# Patient Record
Sex: MALE | Race: BLACK OR AFRICAN AMERICAN | NOT HISPANIC OR LATINO | Employment: OTHER | URBAN - METROPOLITAN AREA
[De-identification: names, ages, dates, MRNs, and addresses within clinical notes are randomized per-mention and may not be internally consistent; named-entity substitution may affect disease eponyms.]

---

## 2019-04-29 ENCOUNTER — APPOINTMENT (EMERGENCY)
Dept: RADIOLOGY | Facility: HOSPITAL | Age: 84
DRG: 884 | End: 2019-04-29
Payer: MEDICARE

## 2019-04-29 ENCOUNTER — HOSPITAL ENCOUNTER (INPATIENT)
Facility: HOSPITAL | Age: 84
LOS: 38 days | Discharge: NURSING, REHAB OR LONG-TERM CARE | DRG: 884 | End: 2019-06-06
Attending: EMERGENCY MEDICINE | Admitting: FAMILY MEDICINE
Payer: MEDICARE

## 2019-04-29 ENCOUNTER — APPOINTMENT (EMERGENCY)
Dept: CT IMAGING | Facility: HOSPITAL | Age: 84
DRG: 884 | End: 2019-04-29
Payer: MEDICARE

## 2019-04-29 DIAGNOSIS — R41.82 ALTERED MENTAL STATUS: Primary | ICD-10-CM

## 2019-04-29 DIAGNOSIS — F03.90 ADVANCED DEMENTIA (HCC): ICD-10-CM

## 2019-04-29 DIAGNOSIS — L60.9 NAIL ABNORMALITIES: ICD-10-CM

## 2019-04-29 DIAGNOSIS — F03.90 DEMENTIA (HCC): ICD-10-CM

## 2019-04-29 DIAGNOSIS — L97.513 RIGHT FOOT ULCER, WITH NECROSIS OF MUSCLE (HCC): ICD-10-CM

## 2019-04-29 DIAGNOSIS — E53.8 B12 DEFICIENCY: ICD-10-CM

## 2019-04-29 DIAGNOSIS — E87.6 HYPOKALEMIA: ICD-10-CM

## 2019-04-29 DIAGNOSIS — I10 ESSENTIAL HYPERTENSION: ICD-10-CM

## 2019-04-29 DIAGNOSIS — D50.8 IRON DEFICIENCY ANEMIA SECONDARY TO INADEQUATE DIETARY IRON INTAKE: ICD-10-CM

## 2019-04-29 PROBLEM — Z87.19 HISTORY OF GASTROESOPHAGEAL REFLUX (GERD): Status: ACTIVE | Noted: 2019-04-29

## 2019-04-29 PROBLEM — R41.0 DISORIENTATION: Status: ACTIVE | Noted: 2019-04-29

## 2019-04-29 PROBLEM — I51.7 LEFT VENTRICULAR HYPERTROPHY BY ELECTROCARDIOGRAM: Status: ACTIVE | Noted: 2019-04-29

## 2019-04-29 LAB
ACANTHOCYTES BLD QL SMEAR: PRESENT
ALBUMIN SERPL BCP-MCNC: 3 G/DL (ref 3–5.2)
ALP SERPL-CCNC: 93 U/L (ref 43–122)
ALT SERPL W P-5'-P-CCNC: 16 U/L (ref 9–52)
AMMONIA PLAS-SCNC: 10 UMOL/L (ref 9–33)
AMMONIA PLAS-SCNC: 22 UMOL/L (ref 9–33)
AMPHETAMINES SERPL QL SCN: NEGATIVE
ANION GAP SERPL CALCULATED.3IONS-SCNC: 4 MMOL/L (ref 5–14)
ANISOCYTOSIS BLD QL SMEAR: PRESENT
APAP SERPL-MCNC: <10 UG/ML (ref 10–20)
APTT PPP: 27 SECONDS (ref 23–34)
AST SERPL W P-5'-P-CCNC: 13 U/L (ref 17–59)
BACTERIA UR QL AUTO: ABNORMAL /HPF
BARBITURATES UR QL: NEGATIVE
BASOPHILS # BLD AUTO: 0.1 THOUSANDS/ΜL (ref 0–0.1)
BASOPHILS NFR BLD AUTO: 1 % (ref 0–1)
BENZODIAZ UR QL: NEGATIVE
BILIRUB SERPL-MCNC: 0.4 MG/DL
BILIRUB UR QL STRIP: NEGATIVE
BUN SERPL-MCNC: 16 MG/DL (ref 5–25)
CALCIUM SERPL-MCNC: 9.4 MG/DL (ref 8.4–10.2)
CAOX CRY URNS QL MICRO: ABNORMAL /HPF
CHLORIDE SERPL-SCNC: 106 MMOL/L (ref 97–108)
CLARITY UR: CLEAR
CO2 SERPL-SCNC: 28 MMOL/L (ref 22–30)
COCAINE UR QL: NEGATIVE
COLOR UR: ABNORMAL
CREAT SERPL-MCNC: 0.84 MG/DL (ref 0.7–1.5)
EOSINOPHIL # BLD AUTO: 0.2 THOUSAND/ΜL (ref 0–0.4)
EOSINOPHIL NFR BLD AUTO: 2 % (ref 0–6)
ERYTHROCYTE [DISTWIDTH] IN BLOOD BY AUTOMATED COUNT: 20.5 %
GFR SERPL CREATININE-BSD FRML MDRD: 94 ML/MIN/1.73SQ M
GLUCOSE SERPL-MCNC: 136 MG/DL (ref 70–99)
GLUCOSE UR STRIP-MCNC: NEGATIVE MG/DL
HCT VFR BLD AUTO: 37.9 % (ref 41–53)
HGB BLD-MCNC: 11.7 G/DL (ref 13.5–17.5)
HGB UR QL STRIP.AUTO: NEGATIVE
HYPERCHROMIA BLD QL SMEAR: PRESENT
INR PPP: 0.99 (ref 0.89–1.1)
KETONES UR STRIP-MCNC: NEGATIVE MG/DL
LEUKOCYTE ESTERASE UR QL STRIP: NEGATIVE
LYMPHOCYTES # BLD AUTO: 1.4 THOUSANDS/ΜL (ref 0.5–4)
LYMPHOCYTES NFR BLD AUTO: 19 % (ref 25–45)
MAGNESIUM SERPL-MCNC: 2.2 MG/DL (ref 1.6–2.3)
MCH RBC QN AUTO: 23.8 PG (ref 26–34)
MCHC RBC AUTO-ENTMCNC: 31 G/DL (ref 31–36)
MCV RBC AUTO: 77 FL (ref 80–100)
METHADONE UR QL: NEGATIVE
MONOCYTES # BLD AUTO: 0.5 THOUSAND/ΜL (ref 0.2–0.9)
MONOCYTES NFR BLD AUTO: 6 % (ref 1–10)
MUCOUS THREADS UR QL AUTO: ABNORMAL
NEUTROPHILS # BLD AUTO: 5.3 THOUSANDS/ΜL (ref 1.8–7.8)
NEUTS SEG NFR BLD AUTO: 71 % (ref 45–65)
NITRITE UR QL STRIP: NEGATIVE
NON-SQ EPI CELLS URNS QL MICRO: ABNORMAL /HPF
OPIATES UR QL SCN: NEGATIVE
OVALOCYTES BLD QL SMEAR: PRESENT
PCP UR QL: NEGATIVE
PH UR STRIP.AUTO: 6 [PH]
PLATELET # BLD AUTO: 297 THOUSANDS/UL (ref 150–450)
PLATELET # BLD AUTO: 334 THOUSANDS/UL (ref 150–450)
PLATELET BLD QL SMEAR: ADEQUATE
PMV BLD AUTO: 9.2 FL (ref 8.9–12.7)
POIKILOCYTOSIS BLD QL SMEAR: PRESENT
POTASSIUM SERPL-SCNC: 4 MMOL/L (ref 3.6–5)
PROT SERPL-MCNC: 5.8 G/DL (ref 5.9–8.4)
PROT UR STRIP-MCNC: ABNORMAL MG/DL
PROTHROMBIN TIME: 10.5 SECONDS (ref 9.5–11.6)
RBC # BLD AUTO: 4.94 MILLION/UL (ref 4.5–5.9)
RBC #/AREA URNS AUTO: ABNORMAL /HPF
RBC MORPH BLD: NORMAL
SALICYLATES SERPL-MCNC: <1 MG/DL (ref 10–30)
SCHISTOCYTES BLD QL SMEAR: PRESENT
SODIUM SERPL-SCNC: 138 MMOL/L (ref 137–147)
SP GR UR STRIP.AUTO: 1.02 (ref 1–1.04)
THC UR QL: NEGATIVE
TROPONIN I SERPL-MCNC: <0.01 NG/ML (ref 0–0.03)
TSH SERPL DL<=0.05 MIU/L-ACNC: 1.51 UIU/ML (ref 0.47–4.68)
UROBILINOGEN UA: 4 MG/DL
WBC # BLD AUTO: 7.5 THOUSAND/UL (ref 4.5–11)
WBC #/AREA URNS AUTO: ABNORMAL /HPF

## 2019-04-29 PROCEDURE — 81001 URINALYSIS AUTO W/SCOPE: CPT | Performed by: EMERGENCY MEDICINE

## 2019-04-29 PROCEDURE — 82140 ASSAY OF AMMONIA: CPT | Performed by: EMERGENCY MEDICINE

## 2019-04-29 PROCEDURE — 99222 1ST HOSP IP/OBS MODERATE 55: CPT | Performed by: INTERNAL MEDICINE

## 2019-04-29 PROCEDURE — 82607 VITAMIN B-12: CPT | Performed by: INTERNAL MEDICINE

## 2019-04-29 PROCEDURE — 83735 ASSAY OF MAGNESIUM: CPT | Performed by: EMERGENCY MEDICINE

## 2019-04-29 PROCEDURE — 80329 ANALGESICS NON-OPIOID 1 OR 2: CPT | Performed by: EMERGENCY MEDICINE

## 2019-04-29 PROCEDURE — 99285 EMERGENCY DEPT VISIT HI MDM: CPT

## 2019-04-29 PROCEDURE — 80053 COMPREHEN METABOLIC PANEL: CPT | Performed by: EMERGENCY MEDICINE

## 2019-04-29 PROCEDURE — 70450 CT HEAD/BRAIN W/O DYE: CPT

## 2019-04-29 PROCEDURE — 71046 X-RAY EXAM CHEST 2 VIEWS: CPT

## 2019-04-29 PROCEDURE — 84484 ASSAY OF TROPONIN QUANT: CPT | Performed by: EMERGENCY MEDICINE

## 2019-04-29 PROCEDURE — 84443 ASSAY THYROID STIM HORMONE: CPT | Performed by: EMERGENCY MEDICINE

## 2019-04-29 PROCEDURE — 85025 COMPLETE CBC W/AUTO DIFF WBC: CPT | Performed by: EMERGENCY MEDICINE

## 2019-04-29 PROCEDURE — 85049 AUTOMATED PLATELET COUNT: CPT | Performed by: INTERNAL MEDICINE

## 2019-04-29 PROCEDURE — 93005 ELECTROCARDIOGRAM TRACING: CPT

## 2019-04-29 PROCEDURE — 1124F ACP DISCUSS-NO DSCNMKR DOCD: CPT | Performed by: FAMILY MEDICINE

## 2019-04-29 PROCEDURE — 85610 PROTHROMBIN TIME: CPT | Performed by: EMERGENCY MEDICINE

## 2019-04-29 PROCEDURE — 36415 COLL VENOUS BLD VENIPUNCTURE: CPT | Performed by: EMERGENCY MEDICINE

## 2019-04-29 PROCEDURE — 82746 ASSAY OF FOLIC ACID SERUM: CPT | Performed by: INTERNAL MEDICINE

## 2019-04-29 PROCEDURE — 99284 EMERGENCY DEPT VISIT MOD MDM: CPT | Performed by: EMERGENCY MEDICINE

## 2019-04-29 PROCEDURE — 82140 ASSAY OF AMMONIA: CPT | Performed by: INTERNAL MEDICINE

## 2019-04-29 PROCEDURE — 85730 THROMBOPLASTIN TIME PARTIAL: CPT | Performed by: EMERGENCY MEDICINE

## 2019-04-29 PROCEDURE — 80307 DRUG TEST PRSMV CHEM ANLYZR: CPT | Performed by: EMERGENCY MEDICINE

## 2019-04-29 PROCEDURE — 86592 SYPHILIS TEST NON-TREP QUAL: CPT | Performed by: INTERNAL MEDICINE

## 2019-04-29 PROCEDURE — 82075 ASSAY OF BREATH ETHANOL: CPT | Performed by: EMERGENCY MEDICINE

## 2019-04-29 RX ORDER — ACETAMINOPHEN 325 MG/1
650 TABLET ORAL EVERY 6 HOURS PRN
Status: DISCONTINUED | OUTPATIENT
Start: 2019-04-29 | End: 2019-06-06 | Stop reason: HOSPADM

## 2019-04-29 RX ORDER — HYDRALAZINE HYDROCHLORIDE 25 MG/1
25 TABLET, FILM COATED ORAL 3 TIMES DAILY PRN
Status: DISCONTINUED | OUTPATIENT
Start: 2019-04-29 | End: 2019-06-04

## 2019-04-29 RX ORDER — ONDANSETRON 2 MG/ML
4 INJECTION INTRAMUSCULAR; INTRAVENOUS EVERY 6 HOURS PRN
Status: DISCONTINUED | OUTPATIENT
Start: 2019-04-29 | End: 2019-04-30

## 2019-04-29 RX ORDER — HYDRALAZINE HYDROCHLORIDE 20 MG/ML
5 INJECTION INTRAMUSCULAR; INTRAVENOUS EVERY 6 HOURS PRN
Status: DISCONTINUED | OUTPATIENT
Start: 2019-04-29 | End: 2019-04-29

## 2019-04-29 RX ORDER — MAGNESIUM HYDROXIDE/ALUMINUM HYDROXICE/SIMETHICONE 120; 1200; 1200 MG/30ML; MG/30ML; MG/30ML
30 SUSPENSION ORAL EVERY 6 HOURS PRN
Status: DISCONTINUED | OUTPATIENT
Start: 2019-04-29 | End: 2019-06-06 | Stop reason: HOSPADM

## 2019-04-29 RX ADMIN — HYDRALAZINE HYDROCHLORIDE 25 MG: 25 TABLET ORAL at 22:03

## 2019-04-30 LAB
ANION GAP SERPL CALCULATED.3IONS-SCNC: 4 MMOL/L (ref 5–14)
ATRIAL RATE: 70 BPM
BASOPHILS # BLD AUTO: 0.1 THOUSANDS/ΜL (ref 0–0.1)
BASOPHILS NFR BLD AUTO: 1 % (ref 0–1)
BUN SERPL-MCNC: 15 MG/DL (ref 5–25)
CALCIUM SERPL-MCNC: 9.2 MG/DL (ref 8.4–10.2)
CHLORIDE SERPL-SCNC: 105 MMOL/L (ref 97–108)
CO2 SERPL-SCNC: 30 MMOL/L (ref 22–30)
CREAT SERPL-MCNC: 0.77 MG/DL (ref 0.7–1.5)
EOSINOPHIL # BLD AUTO: 0.2 THOUSAND/ΜL (ref 0–0.4)
EOSINOPHIL NFR BLD AUTO: 3 % (ref 0–6)
ERYTHROCYTE [DISTWIDTH] IN BLOOD BY AUTOMATED COUNT: 20.9 %
EST. AVERAGE GLUCOSE BLD GHB EST-MCNC: 123 MG/DL
FOLATE SERPL-MCNC: 14 NG/ML (ref 3.1–17.5)
GFR SERPL CREATININE-BSD FRML MDRD: 98 ML/MIN/1.73SQ M
GLUCOSE SERPL-MCNC: 102 MG/DL (ref 70–99)
HBA1C MFR BLD: 5.9 % (ref 4.2–6.3)
HCT VFR BLD AUTO: 36.4 % (ref 41–53)
HGB BLD-MCNC: 11.3 G/DL (ref 13.5–17.5)
LYMPHOCYTES # BLD AUTO: 1.7 THOUSANDS/ΜL (ref 0.5–4)
LYMPHOCYTES NFR BLD AUTO: 24 % (ref 25–45)
MCH RBC QN AUTO: 24.1 PG (ref 26–34)
MCHC RBC AUTO-ENTMCNC: 31.1 G/DL (ref 31–36)
MCV RBC AUTO: 77 FL (ref 80–100)
MONOCYTES # BLD AUTO: 0.6 THOUSAND/ΜL (ref 0.2–0.9)
MONOCYTES NFR BLD AUTO: 9 % (ref 1–10)
NEUTROPHILS # BLD AUTO: 4.5 THOUSANDS/ΜL (ref 1.8–7.8)
NEUTS SEG NFR BLD AUTO: 63 % (ref 45–65)
P AXIS: 35 DEGREES
PLATELET # BLD AUTO: 277 THOUSANDS/UL (ref 150–450)
PMV BLD AUTO: 8.7 FL (ref 8.9–12.7)
POTASSIUM SERPL-SCNC: 3.5 MMOL/L (ref 3.6–5)
PR INTERVAL: 170 MS
QRS AXIS: -57 DEGREES
QRSD INTERVAL: 108 MS
QT INTERVAL: 392 MS
QTC INTERVAL: 423 MS
RBC # BLD AUTO: 4.7 MILLION/UL (ref 4.5–5.9)
RPR SER QL: NORMAL
SODIUM SERPL-SCNC: 139 MMOL/L (ref 137–147)
T WAVE AXIS: -11 DEGREES
VENTRICULAR RATE: 70 BPM
VIT B12 SERPL-MCNC: 142 PG/ML (ref 100–900)
WBC # BLD AUTO: 7.1 THOUSAND/UL (ref 4.5–11)

## 2019-04-30 PROCEDURE — 85025 COMPLETE CBC W/AUTO DIFF WBC: CPT | Performed by: INTERNAL MEDICINE

## 2019-04-30 PROCEDURE — 0HBRXZZ EXCISION OF TOE NAIL, EXTERNAL APPROACH: ICD-10-PCS | Performed by: PODIATRIST

## 2019-04-30 PROCEDURE — 93010 ELECTROCARDIOGRAM REPORT: CPT | Performed by: INTERNAL MEDICINE

## 2019-04-30 PROCEDURE — 83036 HEMOGLOBIN GLYCOSYLATED A1C: CPT | Performed by: PHYSICIAN ASSISTANT

## 2019-04-30 PROCEDURE — 99232 SBSQ HOSP IP/OBS MODERATE 35: CPT | Performed by: FAMILY MEDICINE

## 2019-04-30 PROCEDURE — 80048 BASIC METABOLIC PNL TOTAL CA: CPT | Performed by: INTERNAL MEDICINE

## 2019-04-30 RX ORDER — AMLODIPINE BESYLATE 5 MG/1
5 TABLET ORAL DAILY
Status: DISCONTINUED | OUTPATIENT
Start: 2019-04-30 | End: 2019-05-02

## 2019-04-30 RX ADMIN — AMLODIPINE BESYLATE 5 MG: 5 TABLET ORAL at 11:30

## 2019-04-30 RX ADMIN — ENOXAPARIN SODIUM 40 MG: 40 INJECTION SUBCUTANEOUS at 08:30

## 2019-05-01 LAB
ANION GAP SERPL CALCULATED.3IONS-SCNC: 4 MMOL/L (ref 5–14)
BASOPHILS # BLD AUTO: 0 THOUSANDS/ΜL (ref 0–0.1)
BASOPHILS NFR BLD AUTO: 1 % (ref 0–1)
BUN SERPL-MCNC: 14 MG/DL (ref 5–25)
CALCIUM SERPL-MCNC: 9.2 MG/DL (ref 8.4–10.2)
CHLORIDE SERPL-SCNC: 103 MMOL/L (ref 97–108)
CO2 SERPL-SCNC: 31 MMOL/L (ref 22–30)
CREAT SERPL-MCNC: 0.76 MG/DL (ref 0.7–1.5)
EOSINOPHIL # BLD AUTO: 0.2 THOUSAND/ΜL (ref 0–0.4)
EOSINOPHIL NFR BLD AUTO: 2 % (ref 0–6)
ERYTHROCYTE [DISTWIDTH] IN BLOOD BY AUTOMATED COUNT: 21 %
FERRITIN SERPL-MCNC: 9 NG/ML (ref 8–388)
FERRITIN SERPL-MCNC: 9 NG/ML (ref 8–388)
GFR SERPL CREATININE-BSD FRML MDRD: 98 ML/MIN/1.73SQ M
GLUCOSE SERPL-MCNC: 88 MG/DL (ref 70–99)
HCT VFR BLD AUTO: 37.3 % (ref 41–53)
HGB BLD-MCNC: 11.4 G/DL (ref 13.5–17.5)
IRON SATN MFR SERPL: 17 %
IRON SERPL-MCNC: 53 UG/DL (ref 65–175)
LYMPHOCYTES # BLD AUTO: 1.5 THOUSANDS/ΜL (ref 0.5–4)
LYMPHOCYTES NFR BLD AUTO: 21 % (ref 25–45)
MCH RBC QN AUTO: 23.8 PG (ref 26–34)
MCHC RBC AUTO-ENTMCNC: 30.5 G/DL (ref 31–36)
MCV RBC AUTO: 78 FL (ref 80–100)
MONOCYTES # BLD AUTO: 0.5 THOUSAND/ΜL (ref 0.2–0.9)
MONOCYTES NFR BLD AUTO: 7 % (ref 1–10)
NEUTROPHILS # BLD AUTO: 4.9 THOUSANDS/ΜL (ref 1.8–7.8)
NEUTS SEG NFR BLD AUTO: 69 % (ref 45–65)
PLATELET # BLD AUTO: 298 THOUSANDS/UL (ref 150–450)
PMV BLD AUTO: 9 FL (ref 8.9–12.7)
POTASSIUM SERPL-SCNC: 3.5 MMOL/L (ref 3.6–5)
RBC # BLD AUTO: 4.78 MILLION/UL (ref 4.5–5.9)
SODIUM SERPL-SCNC: 138 MMOL/L (ref 137–147)
TIBC SERPL-MCNC: 308 UG/DL (ref 250–450)
WBC # BLD AUTO: 7.1 THOUSAND/UL (ref 4.5–11)

## 2019-05-01 PROCEDURE — 99231 SBSQ HOSP IP/OBS SF/LOW 25: CPT | Performed by: INTERNAL MEDICINE

## 2019-05-01 PROCEDURE — 83550 IRON BINDING TEST: CPT | Performed by: FAMILY MEDICINE

## 2019-05-01 PROCEDURE — G8978 MOBILITY CURRENT STATUS: HCPCS

## 2019-05-01 PROCEDURE — 97167 OT EVAL HIGH COMPLEX 60 MIN: CPT

## 2019-05-01 PROCEDURE — 82728 ASSAY OF FERRITIN: CPT | Performed by: FAMILY MEDICINE

## 2019-05-01 PROCEDURE — G8979 MOBILITY GOAL STATUS: HCPCS

## 2019-05-01 PROCEDURE — 80048 BASIC METABOLIC PNL TOTAL CA: CPT | Performed by: PHYSICIAN ASSISTANT

## 2019-05-01 PROCEDURE — G8980 MOBILITY D/C STATUS: HCPCS

## 2019-05-01 PROCEDURE — 97162 PT EVAL MOD COMPLEX 30 MIN: CPT

## 2019-05-01 PROCEDURE — 83540 ASSAY OF IRON: CPT | Performed by: FAMILY MEDICINE

## 2019-05-01 PROCEDURE — 85025 COMPLETE CBC W/AUTO DIFF WBC: CPT | Performed by: PHYSICIAN ASSISTANT

## 2019-05-01 RX ORDER — DONEPEZIL HYDROCHLORIDE 5 MG/1
5 TABLET, FILM COATED ORAL
Status: DISCONTINUED | OUTPATIENT
Start: 2019-05-01 | End: 2019-06-06 | Stop reason: HOSPADM

## 2019-05-01 RX ADMIN — DONEPEZIL HYDROCHLORIDE 5 MG: 5 TABLET, FILM COATED ORAL at 21:27

## 2019-05-01 RX ADMIN — ENOXAPARIN SODIUM 40 MG: 40 INJECTION SUBCUTANEOUS at 08:22

## 2019-05-01 RX ADMIN — AMLODIPINE BESYLATE 5 MG: 5 TABLET ORAL at 08:22

## 2019-05-02 PROCEDURE — 99231 SBSQ HOSP IP/OBS SF/LOW 25: CPT | Performed by: INTERNAL MEDICINE

## 2019-05-02 RX ORDER — AMLODIPINE BESYLATE 10 MG/1
10 TABLET ORAL DAILY
Status: DISCONTINUED | OUTPATIENT
Start: 2019-05-03 | End: 2019-05-24

## 2019-05-02 RX ORDER — POTASSIUM CHLORIDE 20 MEQ/1
40 TABLET, EXTENDED RELEASE ORAL ONCE
Status: COMPLETED | OUTPATIENT
Start: 2019-05-02 | End: 2019-05-02

## 2019-05-02 RX ADMIN — DONEPEZIL HYDROCHLORIDE 5 MG: 5 TABLET, FILM COATED ORAL at 23:00

## 2019-05-02 RX ADMIN — ENOXAPARIN SODIUM 40 MG: 40 INJECTION SUBCUTANEOUS at 08:01

## 2019-05-02 RX ADMIN — POTASSIUM CHLORIDE 40 MEQ: 20 TABLET, EXTENDED RELEASE ORAL at 15:30

## 2019-05-02 RX ADMIN — AMLODIPINE BESYLATE 5 MG: 5 TABLET ORAL at 08:01

## 2019-05-02 RX ADMIN — HYDRALAZINE HYDROCHLORIDE 25 MG: 25 TABLET ORAL at 21:44

## 2019-05-03 PROCEDURE — 99231 SBSQ HOSP IP/OBS SF/LOW 25: CPT | Performed by: INTERNAL MEDICINE

## 2019-05-03 RX ADMIN — AMLODIPINE BESYLATE 10 MG: 10 TABLET ORAL at 08:06

## 2019-05-03 RX ADMIN — ENOXAPARIN SODIUM 40 MG: 40 INJECTION SUBCUTANEOUS at 08:06

## 2019-05-03 RX ADMIN — DONEPEZIL HYDROCHLORIDE 5 MG: 5 TABLET, FILM COATED ORAL at 22:29

## 2019-05-04 PROBLEM — D50.9 IRON DEFICIENCY ANEMIA: Status: ACTIVE | Noted: 2019-05-04

## 2019-05-04 PROCEDURE — 99232 SBSQ HOSP IP/OBS MODERATE 35: CPT | Performed by: FAMILY MEDICINE

## 2019-05-04 RX ORDER — FERROUS SULFATE 325(65) MG
325 TABLET ORAL 2 TIMES DAILY WITH MEALS
Status: DISCONTINUED | OUTPATIENT
Start: 2019-05-04 | End: 2019-06-06 | Stop reason: HOSPADM

## 2019-05-04 RX ORDER — POTASSIUM CHLORIDE 20 MEQ/1
40 TABLET, EXTENDED RELEASE ORAL 2 TIMES DAILY
Status: DISCONTINUED | OUTPATIENT
Start: 2019-05-04 | End: 2019-05-04

## 2019-05-04 RX ADMIN — FERROUS SULFATE TAB 325 MG (65 MG ELEMENTAL FE) 325 MG: 325 (65 FE) TAB at 16:17

## 2019-05-04 RX ADMIN — AMLODIPINE BESYLATE 10 MG: 10 TABLET ORAL at 10:12

## 2019-05-04 RX ADMIN — DONEPEZIL HYDROCHLORIDE 5 MG: 5 TABLET, FILM COATED ORAL at 21:26

## 2019-05-04 RX ADMIN — POTASSIUM CHLORIDE 40 MEQ: 20 TABLET, EXTENDED RELEASE ORAL at 10:13

## 2019-05-04 RX ADMIN — ENOXAPARIN SODIUM 40 MG: 40 INJECTION SUBCUTANEOUS at 10:12

## 2019-05-05 PROCEDURE — 99231 SBSQ HOSP IP/OBS SF/LOW 25: CPT | Performed by: FAMILY MEDICINE

## 2019-05-05 RX ADMIN — FERROUS SULFATE TAB 325 MG (65 MG ELEMENTAL FE) 325 MG: 325 (65 FE) TAB at 08:03

## 2019-05-05 RX ADMIN — DONEPEZIL HYDROCHLORIDE 5 MG: 5 TABLET, FILM COATED ORAL at 21:36

## 2019-05-05 RX ADMIN — AMLODIPINE BESYLATE 10 MG: 10 TABLET ORAL at 08:02

## 2019-05-05 RX ADMIN — FERROUS SULFATE TAB 325 MG (65 MG ELEMENTAL FE) 325 MG: 325 (65 FE) TAB at 15:56

## 2019-05-06 PROCEDURE — 99231 SBSQ HOSP IP/OBS SF/LOW 25: CPT | Performed by: PHYSICIAN ASSISTANT

## 2019-05-06 RX ORDER — POTASSIUM CHLORIDE 20 MEQ/1
40 TABLET, EXTENDED RELEASE ORAL ONCE
Status: COMPLETED | OUTPATIENT
Start: 2019-05-06 | End: 2019-05-06

## 2019-05-06 RX ADMIN — FERROUS SULFATE TAB 325 MG (65 MG ELEMENTAL FE) 325 MG: 325 (65 FE) TAB at 09:43

## 2019-05-06 RX ADMIN — AMLODIPINE BESYLATE 10 MG: 10 TABLET ORAL at 09:43

## 2019-05-06 RX ADMIN — POTASSIUM CHLORIDE 40 MEQ: 20 TABLET, EXTENDED RELEASE ORAL at 09:42

## 2019-05-06 RX ADMIN — FERROUS SULFATE TAB 325 MG (65 MG ELEMENTAL FE) 325 MG: 325 (65 FE) TAB at 18:07

## 2019-05-06 RX ADMIN — DONEPEZIL HYDROCHLORIDE 5 MG: 5 TABLET, FILM COATED ORAL at 21:28

## 2019-05-07 PROBLEM — E53.8 B12 DEFICIENCY: Status: ACTIVE | Noted: 2019-05-07

## 2019-05-07 PROCEDURE — 99231 SBSQ HOSP IP/OBS SF/LOW 25: CPT | Performed by: PHYSICIAN ASSISTANT

## 2019-05-07 RX ORDER — CYANOCOBALAMIN 1000 UG/ML
1000 INJECTION INTRAMUSCULAR; SUBCUTANEOUS DAILY
Status: COMPLETED | OUTPATIENT
Start: 2019-05-07 | End: 2019-05-13

## 2019-05-07 RX ADMIN — DONEPEZIL HYDROCHLORIDE 5 MG: 5 TABLET, FILM COATED ORAL at 22:34

## 2019-05-07 RX ADMIN — FERROUS SULFATE TAB 325 MG (65 MG ELEMENTAL FE) 325 MG: 325 (65 FE) TAB at 09:07

## 2019-05-07 RX ADMIN — FERROUS SULFATE TAB 325 MG (65 MG ELEMENTAL FE) 325 MG: 325 (65 FE) TAB at 16:43

## 2019-05-07 RX ADMIN — CYANOCOBALAMIN 1000 MCG: 1000 INJECTION INTRAMUSCULAR; SUBCUTANEOUS at 16:11

## 2019-05-07 RX ADMIN — AMLODIPINE BESYLATE 10 MG: 10 TABLET ORAL at 09:06

## 2019-05-08 PROCEDURE — 99232 SBSQ HOSP IP/OBS MODERATE 35: CPT | Performed by: PHYSICIAN ASSISTANT

## 2019-05-08 RX ADMIN — FERROUS SULFATE TAB 325 MG (65 MG ELEMENTAL FE) 325 MG: 325 (65 FE) TAB at 16:14

## 2019-05-08 RX ADMIN — CYANOCOBALAMIN 1000 MCG: 1000 INJECTION INTRAMUSCULAR; SUBCUTANEOUS at 08:16

## 2019-05-08 RX ADMIN — FERROUS SULFATE TAB 325 MG (65 MG ELEMENTAL FE) 325 MG: 325 (65 FE) TAB at 08:17

## 2019-05-08 RX ADMIN — DONEPEZIL HYDROCHLORIDE 5 MG: 5 TABLET, FILM COATED ORAL at 22:45

## 2019-05-08 RX ADMIN — AMLODIPINE BESYLATE 10 MG: 10 TABLET ORAL at 08:17

## 2019-05-09 PROCEDURE — 99231 SBSQ HOSP IP/OBS SF/LOW 25: CPT | Performed by: INTERNAL MEDICINE

## 2019-05-09 RX ADMIN — FERROUS SULFATE TAB 325 MG (65 MG ELEMENTAL FE) 325 MG: 325 (65 FE) TAB at 16:01

## 2019-05-09 RX ADMIN — FERROUS SULFATE TAB 325 MG (65 MG ELEMENTAL FE) 325 MG: 325 (65 FE) TAB at 08:05

## 2019-05-09 RX ADMIN — CYANOCOBALAMIN 1000 MCG: 1000 INJECTION INTRAMUSCULAR; SUBCUTANEOUS at 08:06

## 2019-05-09 RX ADMIN — DONEPEZIL HYDROCHLORIDE 5 MG: 5 TABLET, FILM COATED ORAL at 22:03

## 2019-05-09 RX ADMIN — AMLODIPINE BESYLATE 10 MG: 10 TABLET ORAL at 08:06

## 2019-05-10 PROCEDURE — 99231 SBSQ HOSP IP/OBS SF/LOW 25: CPT | Performed by: INTERNAL MEDICINE

## 2019-05-10 RX ADMIN — FERROUS SULFATE TAB 325 MG (65 MG ELEMENTAL FE) 325 MG: 325 (65 FE) TAB at 10:04

## 2019-05-10 RX ADMIN — CYANOCOBALAMIN 1000 MCG: 1000 INJECTION INTRAMUSCULAR; SUBCUTANEOUS at 10:05

## 2019-05-10 RX ADMIN — FERROUS SULFATE TAB 325 MG (65 MG ELEMENTAL FE) 325 MG: 325 (65 FE) TAB at 16:24

## 2019-05-10 RX ADMIN — DONEPEZIL HYDROCHLORIDE 5 MG: 5 TABLET, FILM COATED ORAL at 21:55

## 2019-05-10 RX ADMIN — AMLODIPINE BESYLATE 10 MG: 10 TABLET ORAL at 10:05

## 2019-05-11 PROCEDURE — 99231 SBSQ HOSP IP/OBS SF/LOW 25: CPT | Performed by: PHYSICIAN ASSISTANT

## 2019-05-11 RX ADMIN — AMLODIPINE BESYLATE 10 MG: 10 TABLET ORAL at 08:32

## 2019-05-11 RX ADMIN — DONEPEZIL HYDROCHLORIDE 5 MG: 5 TABLET, FILM COATED ORAL at 21:24

## 2019-05-11 RX ADMIN — FERROUS SULFATE TAB 325 MG (65 MG ELEMENTAL FE) 325 MG: 325 (65 FE) TAB at 08:32

## 2019-05-11 RX ADMIN — CYANOCOBALAMIN 1000 MCG: 1000 INJECTION INTRAMUSCULAR; SUBCUTANEOUS at 08:32

## 2019-05-11 RX ADMIN — FERROUS SULFATE TAB 325 MG (65 MG ELEMENTAL FE) 325 MG: 325 (65 FE) TAB at 16:56

## 2019-05-12 PROCEDURE — 99231 SBSQ HOSP IP/OBS SF/LOW 25: CPT | Performed by: PHYSICIAN ASSISTANT

## 2019-05-12 RX ADMIN — CYANOCOBALAMIN 1000 MCG: 1000 INJECTION INTRAMUSCULAR; SUBCUTANEOUS at 09:25

## 2019-05-12 RX ADMIN — FERROUS SULFATE TAB 325 MG (65 MG ELEMENTAL FE) 325 MG: 325 (65 FE) TAB at 06:30

## 2019-05-12 RX ADMIN — FERROUS SULFATE TAB 325 MG (65 MG ELEMENTAL FE) 325 MG: 325 (65 FE) TAB at 16:25

## 2019-05-12 RX ADMIN — DONEPEZIL HYDROCHLORIDE 5 MG: 5 TABLET, FILM COATED ORAL at 23:10

## 2019-05-12 RX ADMIN — AMLODIPINE BESYLATE 10 MG: 10 TABLET ORAL at 09:25

## 2019-05-13 PROBLEM — F03.C0 ADVANCED DEMENTIA (HCC): Status: ACTIVE | Noted: 2019-04-29

## 2019-05-13 PROBLEM — F03.90 ADVANCED DEMENTIA (HCC): Status: ACTIVE | Noted: 2019-04-29

## 2019-05-13 PROCEDURE — 99232 SBSQ HOSP IP/OBS MODERATE 35: CPT | Performed by: PHYSICIAN ASSISTANT

## 2019-05-13 RX ADMIN — FERROUS SULFATE TAB 325 MG (65 MG ELEMENTAL FE) 325 MG: 325 (65 FE) TAB at 16:42

## 2019-05-13 RX ADMIN — AMLODIPINE BESYLATE 10 MG: 10 TABLET ORAL at 08:53

## 2019-05-13 RX ADMIN — DONEPEZIL HYDROCHLORIDE 5 MG: 5 TABLET, FILM COATED ORAL at 21:59

## 2019-05-13 RX ADMIN — FERROUS SULFATE TAB 325 MG (65 MG ELEMENTAL FE) 325 MG: 325 (65 FE) TAB at 08:53

## 2019-05-13 RX ADMIN — CYANOCOBALAMIN 1000 MCG: 1000 INJECTION INTRAMUSCULAR; SUBCUTANEOUS at 08:54

## 2019-05-14 PROCEDURE — 99232 SBSQ HOSP IP/OBS MODERATE 35: CPT | Performed by: FAMILY MEDICINE

## 2019-05-14 RX ORDER — CYANOCOBALAMIN 1000 UG/ML
1000 INJECTION INTRAMUSCULAR; SUBCUTANEOUS WEEKLY
Status: DISCONTINUED | OUTPATIENT
Start: 2019-05-21 | End: 2019-06-06 | Stop reason: HOSPADM

## 2019-05-14 RX ORDER — CYANOCOBALAMIN 1000 UG/ML
1000 INJECTION INTRAMUSCULAR; SUBCUTANEOUS WEEKLY
Status: DISCONTINUED | OUTPATIENT
Start: 2019-05-14 | End: 2019-05-14

## 2019-05-14 RX ADMIN — FERROUS SULFATE TAB 325 MG (65 MG ELEMENTAL FE) 325 MG: 325 (65 FE) TAB at 16:08

## 2019-05-14 RX ADMIN — AMLODIPINE BESYLATE 10 MG: 10 TABLET ORAL at 08:04

## 2019-05-14 RX ADMIN — FERROUS SULFATE TAB 325 MG (65 MG ELEMENTAL FE) 325 MG: 325 (65 FE) TAB at 08:05

## 2019-05-14 RX ADMIN — DONEPEZIL HYDROCHLORIDE 5 MG: 5 TABLET, FILM COATED ORAL at 22:19

## 2019-05-15 LAB
ANION GAP SERPL CALCULATED.3IONS-SCNC: 5 MMOL/L (ref 5–14)
ANISOCYTOSIS BLD QL SMEAR: PRESENT
BASOPHILS # BLD AUTO: 0.1 THOUSANDS/ΜL (ref 0–0.1)
BASOPHILS NFR BLD AUTO: 1 % (ref 0–1)
BUN SERPL-MCNC: 20 MG/DL (ref 5–25)
CALCIUM SERPL-MCNC: 9.6 MG/DL (ref 8.4–10.2)
CHLORIDE SERPL-SCNC: 105 MMOL/L (ref 97–108)
CO2 SERPL-SCNC: 30 MMOL/L (ref 22–30)
CREAT SERPL-MCNC: 0.77 MG/DL (ref 0.7–1.5)
EOSINOPHIL # BLD AUTO: 0.2 THOUSAND/ΜL (ref 0–0.4)
EOSINOPHIL NFR BLD AUTO: 2 % (ref 0–6)
ERYTHROCYTE [DISTWIDTH] IN BLOOD BY AUTOMATED COUNT: 21.2 %
GFR SERPL CREATININE-BSD FRML MDRD: 98 ML/MIN/1.73SQ M
GLUCOSE SERPL-MCNC: 105 MG/DL (ref 70–99)
HCT VFR BLD AUTO: 36.7 % (ref 41–53)
HELMET CELLS BLD QL SMEAR: PRESENT
HGB BLD-MCNC: 11.6 G/DL (ref 13.5–17.5)
HYPERCHROMIA BLD QL SMEAR: PRESENT
LYMPHOCYTES # BLD AUTO: 1.6 THOUSANDS/ΜL (ref 0.5–4)
LYMPHOCYTES NFR BLD AUTO: 19 % (ref 25–45)
MCH RBC QN AUTO: 24.6 PG (ref 26–34)
MCHC RBC AUTO-ENTMCNC: 31.6 G/DL (ref 31–36)
MCV RBC AUTO: 78 FL (ref 80–100)
MICROCYTES BLD QL AUTO: PRESENT
MONOCYTES # BLD AUTO: 0.8 THOUSAND/ΜL (ref 0.2–0.9)
MONOCYTES NFR BLD AUTO: 10 % (ref 1–10)
NEUTROPHILS # BLD AUTO: 5.6 THOUSANDS/ΜL (ref 1.8–7.8)
NEUTS SEG NFR BLD AUTO: 69 % (ref 45–65)
OVALOCYTES BLD QL SMEAR: PRESENT
PLATELET # BLD AUTO: 326 THOUSANDS/UL (ref 150–450)
PLATELET BLD QL SMEAR: ADEQUATE
PMV BLD AUTO: 9.3 FL (ref 8.9–12.7)
POIKILOCYTOSIS BLD QL SMEAR: PRESENT
POTASSIUM SERPL-SCNC: 3.4 MMOL/L (ref 3.6–5)
RBC # BLD AUTO: 4.73 MILLION/UL (ref 4.5–5.9)
RBC MORPH BLD: NORMAL
SCHISTOCYTES BLD QL SMEAR: PRESENT
SODIUM SERPL-SCNC: 140 MMOL/L (ref 137–147)
WBC # BLD AUTO: 8.2 THOUSAND/UL (ref 4.5–11)

## 2019-05-15 PROCEDURE — 85025 COMPLETE CBC W/AUTO DIFF WBC: CPT | Performed by: FAMILY MEDICINE

## 2019-05-15 PROCEDURE — 80048 BASIC METABOLIC PNL TOTAL CA: CPT | Performed by: FAMILY MEDICINE

## 2019-05-15 PROCEDURE — 99231 SBSQ HOSP IP/OBS SF/LOW 25: CPT | Performed by: INTERNAL MEDICINE

## 2019-05-15 RX ORDER — POTASSIUM CHLORIDE 20 MEQ/1
40 TABLET, EXTENDED RELEASE ORAL ONCE
Status: COMPLETED | OUTPATIENT
Start: 2019-05-15 | End: 2019-05-15

## 2019-05-15 RX ADMIN — FERROUS SULFATE TAB 325 MG (65 MG ELEMENTAL FE) 325 MG: 325 (65 FE) TAB at 15:54

## 2019-05-15 RX ADMIN — FERROUS SULFATE TAB 325 MG (65 MG ELEMENTAL FE) 325 MG: 325 (65 FE) TAB at 08:19

## 2019-05-15 RX ADMIN — AMLODIPINE BESYLATE 10 MG: 10 TABLET ORAL at 08:19

## 2019-05-15 RX ADMIN — DONEPEZIL HYDROCHLORIDE 5 MG: 5 TABLET, FILM COATED ORAL at 22:41

## 2019-05-15 RX ADMIN — POTASSIUM CHLORIDE 40 MEQ: 20 TABLET, EXTENDED RELEASE ORAL at 18:01

## 2019-05-16 PROCEDURE — 99231 SBSQ HOSP IP/OBS SF/LOW 25: CPT | Performed by: INTERNAL MEDICINE

## 2019-05-16 RX ADMIN — FERROUS SULFATE TAB 325 MG (65 MG ELEMENTAL FE) 325 MG: 325 (65 FE) TAB at 16:33

## 2019-05-16 RX ADMIN — DONEPEZIL HYDROCHLORIDE 5 MG: 5 TABLET, FILM COATED ORAL at 22:13

## 2019-05-16 RX ADMIN — FERROUS SULFATE TAB 325 MG (65 MG ELEMENTAL FE) 325 MG: 325 (65 FE) TAB at 09:21

## 2019-05-16 RX ADMIN — AMLODIPINE BESYLATE 10 MG: 10 TABLET ORAL at 09:21

## 2019-05-17 LAB
ANION GAP SERPL CALCULATED.3IONS-SCNC: 5 MMOL/L (ref 5–14)
BUN SERPL-MCNC: 20 MG/DL (ref 5–25)
CALCIUM SERPL-MCNC: 9.5 MG/DL (ref 8.4–10.2)
CHLORIDE SERPL-SCNC: 105 MMOL/L (ref 97–108)
CO2 SERPL-SCNC: 31 MMOL/L (ref 22–30)
CREAT SERPL-MCNC: 0.64 MG/DL (ref 0.7–1.5)
GFR SERPL CREATININE-BSD FRML MDRD: 105 ML/MIN/1.73SQ M
GLUCOSE SERPL-MCNC: 89 MG/DL (ref 70–99)
POTASSIUM SERPL-SCNC: 3.5 MMOL/L (ref 3.6–5)
SODIUM SERPL-SCNC: 141 MMOL/L (ref 137–147)

## 2019-05-17 PROCEDURE — 80048 BASIC METABOLIC PNL TOTAL CA: CPT | Performed by: INTERNAL MEDICINE

## 2019-05-17 PROCEDURE — 99231 SBSQ HOSP IP/OBS SF/LOW 25: CPT | Performed by: INTERNAL MEDICINE

## 2019-05-17 RX ADMIN — FERROUS SULFATE TAB 325 MG (65 MG ELEMENTAL FE) 325 MG: 325 (65 FE) TAB at 16:55

## 2019-05-17 RX ADMIN — AMLODIPINE BESYLATE 10 MG: 10 TABLET ORAL at 08:08

## 2019-05-17 RX ADMIN — DONEPEZIL HYDROCHLORIDE 5 MG: 5 TABLET, FILM COATED ORAL at 21:58

## 2019-05-17 RX ADMIN — FERROUS SULFATE TAB 325 MG (65 MG ELEMENTAL FE) 325 MG: 325 (65 FE) TAB at 08:08

## 2019-05-18 PROCEDURE — 99232 SBSQ HOSP IP/OBS MODERATE 35: CPT | Performed by: PHYSICIAN ASSISTANT

## 2019-05-18 RX ORDER — POTASSIUM CHLORIDE 20 MEQ/1
20 TABLET, EXTENDED RELEASE ORAL ONCE
Status: COMPLETED | OUTPATIENT
Start: 2019-05-18 | End: 2019-05-18

## 2019-05-18 RX ADMIN — FERROUS SULFATE TAB 325 MG (65 MG ELEMENTAL FE) 325 MG: 325 (65 FE) TAB at 16:57

## 2019-05-18 RX ADMIN — DONEPEZIL HYDROCHLORIDE 5 MG: 5 TABLET, FILM COATED ORAL at 22:32

## 2019-05-18 RX ADMIN — AMLODIPINE BESYLATE 10 MG: 10 TABLET ORAL at 09:14

## 2019-05-18 RX ADMIN — FERROUS SULFATE TAB 325 MG (65 MG ELEMENTAL FE) 325 MG: 325 (65 FE) TAB at 09:14

## 2019-05-18 RX ADMIN — POTASSIUM CHLORIDE 20 MEQ: 20 TABLET, EXTENDED RELEASE ORAL at 12:39

## 2019-05-19 LAB
ANION GAP SERPL CALCULATED.3IONS-SCNC: 5 MMOL/L (ref 5–14)
ANISOCYTOSIS BLD QL SMEAR: PRESENT
BASOPHILS # BLD AUTO: 0.1 THOUSANDS/ΜL (ref 0–0.1)
BASOPHILS NFR BLD AUTO: 1 % (ref 0–1)
BUN SERPL-MCNC: 19 MG/DL (ref 5–25)
CALCIUM SERPL-MCNC: 9.4 MG/DL (ref 8.4–10.2)
CHLORIDE SERPL-SCNC: 105 MMOL/L (ref 97–108)
CO2 SERPL-SCNC: 29 MMOL/L (ref 22–30)
CREAT SERPL-MCNC: 0.68 MG/DL (ref 0.7–1.5)
EOSINOPHIL # BLD AUTO: 0.2 THOUSAND/ΜL (ref 0–0.4)
EOSINOPHIL NFR BLD AUTO: 3 % (ref 0–6)
ERYTHROCYTE [DISTWIDTH] IN BLOOD BY AUTOMATED COUNT: 22.7 %
GFR SERPL CREATININE-BSD FRML MDRD: 103 ML/MIN/1.73SQ M
GLUCOSE SERPL-MCNC: 98 MG/DL (ref 70–99)
HCT VFR BLD AUTO: 38.6 % (ref 41–53)
HGB BLD-MCNC: 11.9 G/DL (ref 13.5–17.5)
LYMPHOCYTES # BLD AUTO: 1.7 THOUSANDS/ΜL (ref 0.5–4)
LYMPHOCYTES NFR BLD AUTO: 24 % (ref 25–45)
MCH RBC QN AUTO: 24.3 PG (ref 26–34)
MCHC RBC AUTO-ENTMCNC: 30.8 G/DL (ref 31–36)
MCV RBC AUTO: 79 FL (ref 80–100)
MICROCYTES BLD QL AUTO: PRESENT
MONOCYTES # BLD AUTO: 0.6 THOUSAND/ΜL (ref 0.2–0.9)
MONOCYTES NFR BLD AUTO: 9 % (ref 1–10)
NEUTROPHILS # BLD AUTO: 4.5 THOUSANDS/ΜL (ref 1.8–7.8)
NEUTS SEG NFR BLD AUTO: 64 % (ref 45–65)
PLATELET # BLD AUTO: 306 THOUSANDS/UL (ref 150–450)
PLATELET BLD QL SMEAR: ADEQUATE
PMV BLD AUTO: 9.5 FL (ref 8.9–12.7)
POIKILOCYTOSIS BLD QL SMEAR: PRESENT
POTASSIUM SERPL-SCNC: 3.4 MMOL/L (ref 3.6–5)
RBC # BLD AUTO: 4.89 MILLION/UL (ref 4.5–5.9)
RBC MORPH BLD: NORMAL
SODIUM SERPL-SCNC: 139 MMOL/L (ref 137–147)
WBC # BLD AUTO: 7 THOUSAND/UL (ref 4.5–11)

## 2019-05-19 PROCEDURE — 85025 COMPLETE CBC W/AUTO DIFF WBC: CPT | Performed by: PHYSICIAN ASSISTANT

## 2019-05-19 PROCEDURE — 99232 SBSQ HOSP IP/OBS MODERATE 35: CPT | Performed by: PHYSICIAN ASSISTANT

## 2019-05-19 PROCEDURE — 80048 BASIC METABOLIC PNL TOTAL CA: CPT | Performed by: PHYSICIAN ASSISTANT

## 2019-05-19 RX ORDER — POTASSIUM CHLORIDE 20 MEQ/1
40 TABLET, EXTENDED RELEASE ORAL ONCE
Status: COMPLETED | OUTPATIENT
Start: 2019-05-19 | End: 2019-05-19

## 2019-05-19 RX ADMIN — AMLODIPINE BESYLATE 10 MG: 10 TABLET ORAL at 16:29

## 2019-05-19 RX ADMIN — DONEPEZIL HYDROCHLORIDE 5 MG: 5 TABLET, FILM COATED ORAL at 21:05

## 2019-05-19 RX ADMIN — FERROUS SULFATE TAB 325 MG (65 MG ELEMENTAL FE) 325 MG: 325 (65 FE) TAB at 08:55

## 2019-05-19 RX ADMIN — AMLODIPINE BESYLATE 10 MG: 10 TABLET ORAL at 08:55

## 2019-05-19 RX ADMIN — POTASSIUM CHLORIDE 40 MEQ: 20 TABLET, EXTENDED RELEASE ORAL at 08:55

## 2019-05-19 RX ADMIN — FERROUS SULFATE TAB 325 MG (65 MG ELEMENTAL FE) 325 MG: 325 (65 FE) TAB at 16:29

## 2019-05-20 LAB
ANION GAP SERPL CALCULATED.3IONS-SCNC: 4 MMOL/L (ref 5–14)
BUN SERPL-MCNC: 17 MG/DL (ref 5–25)
CALCIUM SERPL-MCNC: 9.1 MG/DL (ref 8.4–10.2)
CHLORIDE SERPL-SCNC: 106 MMOL/L (ref 97–108)
CO2 SERPL-SCNC: 28 MMOL/L (ref 22–30)
CREAT SERPL-MCNC: 0.62 MG/DL (ref 0.7–1.5)
GFR SERPL CREATININE-BSD FRML MDRD: 107 ML/MIN/1.73SQ M
GLUCOSE SERPL-MCNC: 93 MG/DL (ref 70–99)
POTASSIUM SERPL-SCNC: 3.6 MMOL/L (ref 3.6–5)
SODIUM SERPL-SCNC: 138 MMOL/L (ref 137–147)

## 2019-05-20 PROCEDURE — 99231 SBSQ HOSP IP/OBS SF/LOW 25: CPT | Performed by: PHYSICIAN ASSISTANT

## 2019-05-20 PROCEDURE — 80048 BASIC METABOLIC PNL TOTAL CA: CPT | Performed by: PHYSICIAN ASSISTANT

## 2019-05-20 RX ADMIN — AMLODIPINE BESYLATE 10 MG: 10 TABLET ORAL at 09:40

## 2019-05-20 RX ADMIN — DONEPEZIL HYDROCHLORIDE 5 MG: 5 TABLET, FILM COATED ORAL at 21:18

## 2019-05-20 RX ADMIN — FERROUS SULFATE TAB 325 MG (65 MG ELEMENTAL FE) 325 MG: 325 (65 FE) TAB at 16:34

## 2019-05-20 RX ADMIN — FERROUS SULFATE TAB 325 MG (65 MG ELEMENTAL FE) 325 MG: 325 (65 FE) TAB at 09:39

## 2019-05-21 PROCEDURE — 99232 SBSQ HOSP IP/OBS MODERATE 35: CPT | Performed by: FAMILY MEDICINE

## 2019-05-21 RX ADMIN — DONEPEZIL HYDROCHLORIDE 5 MG: 5 TABLET, FILM COATED ORAL at 21:29

## 2019-05-21 RX ADMIN — CYANOCOBALAMIN 1000 MCG: 1000 INJECTION INTRAMUSCULAR; SUBCUTANEOUS at 08:41

## 2019-05-21 RX ADMIN — AMLODIPINE BESYLATE 10 MG: 10 TABLET ORAL at 08:41

## 2019-05-21 RX ADMIN — FERROUS SULFATE TAB 325 MG (65 MG ELEMENTAL FE) 325 MG: 325 (65 FE) TAB at 16:33

## 2019-05-21 RX ADMIN — FERROUS SULFATE TAB 325 MG (65 MG ELEMENTAL FE) 325 MG: 325 (65 FE) TAB at 08:41

## 2019-05-22 PROCEDURE — 99232 SBSQ HOSP IP/OBS MODERATE 35: CPT | Performed by: FAMILY MEDICINE

## 2019-05-22 RX ADMIN — DONEPEZIL HYDROCHLORIDE 5 MG: 5 TABLET, FILM COATED ORAL at 21:22

## 2019-05-22 RX ADMIN — FERROUS SULFATE TAB 325 MG (65 MG ELEMENTAL FE) 325 MG: 325 (65 FE) TAB at 08:47

## 2019-05-22 RX ADMIN — FERROUS SULFATE TAB 325 MG (65 MG ELEMENTAL FE) 325 MG: 325 (65 FE) TAB at 16:21

## 2019-05-22 RX ADMIN — AMLODIPINE BESYLATE 10 MG: 10 TABLET ORAL at 08:48

## 2019-05-23 PROCEDURE — 99232 SBSQ HOSP IP/OBS MODERATE 35: CPT | Performed by: FAMILY MEDICINE

## 2019-05-23 RX ADMIN — DONEPEZIL HYDROCHLORIDE 5 MG: 5 TABLET, FILM COATED ORAL at 21:18

## 2019-05-23 RX ADMIN — FERROUS SULFATE TAB 325 MG (65 MG ELEMENTAL FE) 325 MG: 325 (65 FE) TAB at 07:58

## 2019-05-23 RX ADMIN — AMLODIPINE BESYLATE 10 MG: 10 TABLET ORAL at 08:00

## 2019-05-23 RX ADMIN — ENOXAPARIN SODIUM 40 MG: 40 INJECTION SUBCUTANEOUS at 11:10

## 2019-05-23 RX ADMIN — FERROUS SULFATE TAB 325 MG (65 MG ELEMENTAL FE) 325 MG: 325 (65 FE) TAB at 16:37

## 2019-05-24 ENCOUNTER — APPOINTMENT (INPATIENT)
Dept: NON INVASIVE DIAGNOSTICS | Facility: HOSPITAL | Age: 84
DRG: 884 | End: 2019-05-24
Payer: MEDICARE

## 2019-05-24 PROBLEM — R60.0 LOWER EXTREMITY EDEMA: Status: ACTIVE | Noted: 2019-05-24

## 2019-05-24 LAB
ANISOCYTOSIS BLD QL SMEAR: PRESENT
BASOPHILS # BLD AUTO: 0.1 THOUSANDS/ΜL (ref 0–0.1)
BASOPHILS NFR BLD AUTO: 1 % (ref 0–1)
BURR CELLS BLD QL SMEAR: PRESENT
EOSINOPHIL # BLD AUTO: 0.1 THOUSAND/ΜL (ref 0–0.4)
EOSINOPHIL NFR BLD AUTO: 2 % (ref 0–6)
ERYTHROCYTE [DISTWIDTH] IN BLOOD BY AUTOMATED COUNT: 23.5 %
HCT VFR BLD AUTO: 37 % (ref 41–53)
HGB BLD-MCNC: 11.5 G/DL (ref 13.5–17.5)
LYMPHOCYTES # BLD AUTO: 1.5 THOUSANDS/ΜL (ref 0.5–4)
LYMPHOCYTES NFR BLD AUTO: 22 % (ref 25–45)
MCH RBC QN AUTO: 24.8 PG (ref 26–34)
MCHC RBC AUTO-ENTMCNC: 31.1 G/DL (ref 31–36)
MCV RBC AUTO: 80 FL (ref 80–100)
MONOCYTES # BLD AUTO: 0.8 THOUSAND/ΜL (ref 0.2–0.9)
MONOCYTES NFR BLD AUTO: 11 % (ref 1–10)
NEUTROPHILS # BLD AUTO: 4.3 THOUSANDS/ΜL (ref 1.8–7.8)
NEUTS SEG NFR BLD AUTO: 63 % (ref 45–65)
PLATELET # BLD AUTO: 283 THOUSANDS/UL (ref 150–450)
PLATELET BLD QL SMEAR: ADEQUATE
PMV BLD AUTO: 9.2 FL (ref 8.9–12.7)
POIKILOCYTOSIS BLD QL SMEAR: PRESENT
POLYCHROMASIA BLD QL SMEAR: PRESENT
RBC # BLD AUTO: 4.64 MILLION/UL (ref 4.5–5.9)
RBC MORPH BLD: NORMAL
WBC # BLD AUTO: 6.8 THOUSAND/UL (ref 4.5–11)

## 2019-05-24 PROCEDURE — 93970 EXTREMITY STUDY: CPT | Performed by: SURGERY

## 2019-05-24 PROCEDURE — 85025 COMPLETE CBC W/AUTO DIFF WBC: CPT | Performed by: FAMILY MEDICINE

## 2019-05-24 PROCEDURE — 99232 SBSQ HOSP IP/OBS MODERATE 35: CPT | Performed by: FAMILY MEDICINE

## 2019-05-24 PROCEDURE — 93970 EXTREMITY STUDY: CPT

## 2019-05-24 RX ORDER — HYDROCHLOROTHIAZIDE 12.5 MG/1
12.5 TABLET ORAL DAILY
Status: DISCONTINUED | OUTPATIENT
Start: 2019-05-25 | End: 2019-06-06 | Stop reason: HOSPADM

## 2019-05-24 RX ADMIN — FERROUS SULFATE TAB 325 MG (65 MG ELEMENTAL FE) 325 MG: 325 (65 FE) TAB at 09:08

## 2019-05-24 RX ADMIN — AMLODIPINE BESYLATE 10 MG: 10 TABLET ORAL at 09:08

## 2019-05-24 RX ADMIN — ENOXAPARIN SODIUM 40 MG: 40 INJECTION SUBCUTANEOUS at 09:09

## 2019-05-24 RX ADMIN — FERROUS SULFATE TAB 325 MG (65 MG ELEMENTAL FE) 325 MG: 325 (65 FE) TAB at 16:05

## 2019-05-24 RX ADMIN — DONEPEZIL HYDROCHLORIDE 5 MG: 5 TABLET, FILM COATED ORAL at 21:32

## 2019-05-25 LAB
ANION GAP SERPL CALCULATED.3IONS-SCNC: 6 MMOL/L (ref 5–14)
BUN SERPL-MCNC: 13 MG/DL (ref 5–25)
CALCIUM SERPL-MCNC: 9.3 MG/DL (ref 8.4–10.2)
CHLORIDE SERPL-SCNC: 103 MMOL/L (ref 97–108)
CO2 SERPL-SCNC: 30 MMOL/L (ref 22–30)
CREAT SERPL-MCNC: 0.68 MG/DL (ref 0.7–1.5)
GFR SERPL CREATININE-BSD FRML MDRD: 103 ML/MIN/1.73SQ M
GLUCOSE SERPL-MCNC: 90 MG/DL (ref 70–99)
POTASSIUM SERPL-SCNC: 3.4 MMOL/L (ref 3.6–5)
SODIUM SERPL-SCNC: 139 MMOL/L (ref 137–147)

## 2019-05-25 PROCEDURE — 99232 SBSQ HOSP IP/OBS MODERATE 35: CPT | Performed by: PHYSICIAN ASSISTANT

## 2019-05-25 PROCEDURE — 80048 BASIC METABOLIC PNL TOTAL CA: CPT | Performed by: FAMILY MEDICINE

## 2019-05-25 RX ORDER — POTASSIUM CHLORIDE 20 MEQ/1
20 TABLET, EXTENDED RELEASE ORAL ONCE
Status: COMPLETED | OUTPATIENT
Start: 2019-05-25 | End: 2019-05-25

## 2019-05-25 RX ADMIN — POTASSIUM CHLORIDE 20 MEQ: 1500 TABLET, EXTENDED RELEASE ORAL at 12:51

## 2019-05-25 RX ADMIN — DONEPEZIL HYDROCHLORIDE 5 MG: 5 TABLET, FILM COATED ORAL at 22:56

## 2019-05-25 RX ADMIN — FERROUS SULFATE TAB 325 MG (65 MG ELEMENTAL FE) 325 MG: 325 (65 FE) TAB at 08:04

## 2019-05-25 RX ADMIN — HYDROCHLOROTHIAZIDE 12.5 MG: 12.5 TABLET ORAL at 08:04

## 2019-05-25 RX ADMIN — ENOXAPARIN SODIUM 40 MG: 40 INJECTION SUBCUTANEOUS at 08:04

## 2019-05-25 RX ADMIN — FERROUS SULFATE TAB 325 MG (65 MG ELEMENTAL FE) 325 MG: 325 (65 FE) TAB at 16:36

## 2019-05-26 PROCEDURE — 99232 SBSQ HOSP IP/OBS MODERATE 35: CPT | Performed by: PHYSICIAN ASSISTANT

## 2019-05-26 RX ADMIN — FERROUS SULFATE TAB 325 MG (65 MG ELEMENTAL FE) 325 MG: 325 (65 FE) TAB at 09:11

## 2019-05-26 RX ADMIN — HYDROCHLOROTHIAZIDE 12.5 MG: 12.5 TABLET ORAL at 09:11

## 2019-05-26 RX ADMIN — ENOXAPARIN SODIUM 40 MG: 40 INJECTION SUBCUTANEOUS at 09:11

## 2019-05-26 RX ADMIN — FERROUS SULFATE TAB 325 MG (65 MG ELEMENTAL FE) 325 MG: 325 (65 FE) TAB at 17:05

## 2019-05-26 RX ADMIN — DONEPEZIL HYDROCHLORIDE 5 MG: 5 TABLET, FILM COATED ORAL at 22:09

## 2019-05-27 PROCEDURE — 99232 SBSQ HOSP IP/OBS MODERATE 35: CPT | Performed by: PHYSICIAN ASSISTANT

## 2019-05-27 RX ADMIN — ENOXAPARIN SODIUM 40 MG: 40 INJECTION SUBCUTANEOUS at 08:44

## 2019-05-27 RX ADMIN — FERROUS SULFATE TAB 325 MG (65 MG ELEMENTAL FE) 325 MG: 325 (65 FE) TAB at 08:45

## 2019-05-27 RX ADMIN — HYDROCHLOROTHIAZIDE 12.5 MG: 12.5 TABLET ORAL at 08:45

## 2019-05-27 RX ADMIN — DONEPEZIL HYDROCHLORIDE 5 MG: 5 TABLET, FILM COATED ORAL at 21:47

## 2019-05-27 RX ADMIN — FERROUS SULFATE TAB 325 MG (65 MG ELEMENTAL FE) 325 MG: 325 (65 FE) TAB at 17:27

## 2019-05-28 PROCEDURE — 99231 SBSQ HOSP IP/OBS SF/LOW 25: CPT | Performed by: INTERNAL MEDICINE

## 2019-05-28 RX ORDER — POTASSIUM CHLORIDE 20 MEQ/1
20 TABLET, EXTENDED RELEASE ORAL DAILY
Status: DISCONTINUED | OUTPATIENT
Start: 2019-05-28 | End: 2019-06-04

## 2019-05-28 RX ADMIN — POTASSIUM CHLORIDE 20 MEQ: 20 TABLET, EXTENDED RELEASE ORAL at 10:38

## 2019-05-28 RX ADMIN — FERROUS SULFATE TAB 325 MG (65 MG ELEMENTAL FE) 325 MG: 325 (65 FE) TAB at 16:37

## 2019-05-28 RX ADMIN — ENOXAPARIN SODIUM 40 MG: 40 INJECTION SUBCUTANEOUS at 08:16

## 2019-05-28 RX ADMIN — FERROUS SULFATE TAB 325 MG (65 MG ELEMENTAL FE) 325 MG: 325 (65 FE) TAB at 08:16

## 2019-05-28 RX ADMIN — HYDROCHLOROTHIAZIDE 12.5 MG: 12.5 TABLET ORAL at 08:16

## 2019-05-28 RX ADMIN — DONEPEZIL HYDROCHLORIDE 5 MG: 5 TABLET, FILM COATED ORAL at 21:13

## 2019-05-28 RX ADMIN — CYANOCOBALAMIN 1000 MCG: 1000 INJECTION INTRAMUSCULAR; SUBCUTANEOUS at 08:16

## 2019-05-29 PROCEDURE — 99232 SBSQ HOSP IP/OBS MODERATE 35: CPT | Performed by: INTERNAL MEDICINE

## 2019-05-29 PROCEDURE — G8989 SELF CARE D/C STATUS: HCPCS

## 2019-05-29 PROCEDURE — 97168 OT RE-EVAL EST PLAN CARE: CPT

## 2019-05-29 PROCEDURE — G8987 SELF CARE CURRENT STATUS: HCPCS

## 2019-05-29 PROCEDURE — G8988 SELF CARE GOAL STATUS: HCPCS

## 2019-05-29 RX ADMIN — POTASSIUM CHLORIDE 20 MEQ: 20 TABLET, EXTENDED RELEASE ORAL at 08:19

## 2019-05-29 RX ADMIN — FERROUS SULFATE TAB 325 MG (65 MG ELEMENTAL FE) 325 MG: 325 (65 FE) TAB at 08:19

## 2019-05-29 RX ADMIN — FERROUS SULFATE TAB 325 MG (65 MG ELEMENTAL FE) 325 MG: 325 (65 FE) TAB at 16:02

## 2019-05-29 RX ADMIN — DONEPEZIL HYDROCHLORIDE 5 MG: 5 TABLET, FILM COATED ORAL at 22:43

## 2019-05-29 RX ADMIN — HYDROCHLOROTHIAZIDE 12.5 MG: 12.5 TABLET ORAL at 08:19

## 2019-05-29 RX ADMIN — ENOXAPARIN SODIUM 40 MG: 40 INJECTION SUBCUTANEOUS at 08:19

## 2019-05-30 PROCEDURE — 99232 SBSQ HOSP IP/OBS MODERATE 35: CPT | Performed by: INTERNAL MEDICINE

## 2019-05-30 RX ADMIN — HYDROCHLOROTHIAZIDE 12.5 MG: 12.5 TABLET ORAL at 09:15

## 2019-05-30 RX ADMIN — FERROUS SULFATE TAB 325 MG (65 MG ELEMENTAL FE) 325 MG: 325 (65 FE) TAB at 09:15

## 2019-05-30 RX ADMIN — DONEPEZIL HYDROCHLORIDE 5 MG: 5 TABLET, FILM COATED ORAL at 22:16

## 2019-05-30 RX ADMIN — FERROUS SULFATE TAB 325 MG (65 MG ELEMENTAL FE) 325 MG: 325 (65 FE) TAB at 16:10

## 2019-05-30 RX ADMIN — ENOXAPARIN SODIUM 40 MG: 40 INJECTION SUBCUTANEOUS at 09:15

## 2019-05-30 RX ADMIN — POTASSIUM CHLORIDE 20 MEQ: 20 TABLET, EXTENDED RELEASE ORAL at 09:15

## 2019-05-31 PROCEDURE — 99232 SBSQ HOSP IP/OBS MODERATE 35: CPT | Performed by: HOSPITALIST

## 2019-05-31 RX ADMIN — ENOXAPARIN SODIUM 40 MG: 40 INJECTION SUBCUTANEOUS at 08:09

## 2019-05-31 RX ADMIN — HYDROCHLOROTHIAZIDE 12.5 MG: 12.5 TABLET ORAL at 08:09

## 2019-05-31 RX ADMIN — POTASSIUM CHLORIDE 20 MEQ: 20 TABLET, EXTENDED RELEASE ORAL at 08:09

## 2019-05-31 RX ADMIN — FERROUS SULFATE TAB 325 MG (65 MG ELEMENTAL FE) 325 MG: 325 (65 FE) TAB at 16:05

## 2019-05-31 RX ADMIN — FERROUS SULFATE TAB 325 MG (65 MG ELEMENTAL FE) 325 MG: 325 (65 FE) TAB at 08:09

## 2019-05-31 RX ADMIN — DONEPEZIL HYDROCHLORIDE 5 MG: 5 TABLET, FILM COATED ORAL at 21:26

## 2019-06-01 PROCEDURE — 99231 SBSQ HOSP IP/OBS SF/LOW 25: CPT | Performed by: PHYSICIAN ASSISTANT

## 2019-06-01 RX ADMIN — FERROUS SULFATE TAB 325 MG (65 MG ELEMENTAL FE) 325 MG: 325 (65 FE) TAB at 08:33

## 2019-06-01 RX ADMIN — HYDROCHLOROTHIAZIDE 12.5 MG: 12.5 TABLET ORAL at 08:27

## 2019-06-01 RX ADMIN — ENOXAPARIN SODIUM 40 MG: 40 INJECTION SUBCUTANEOUS at 08:26

## 2019-06-01 RX ADMIN — FERROUS SULFATE TAB 325 MG (65 MG ELEMENTAL FE) 325 MG: 325 (65 FE) TAB at 15:37

## 2019-06-01 RX ADMIN — POTASSIUM CHLORIDE 20 MEQ: 20 TABLET, EXTENDED RELEASE ORAL at 08:27

## 2019-06-01 RX ADMIN — DONEPEZIL HYDROCHLORIDE 5 MG: 5 TABLET, FILM COATED ORAL at 21:04

## 2019-06-02 LAB
ANION GAP SERPL CALCULATED.3IONS-SCNC: 5 MMOL/L (ref 5–14)
BASOPHILS # BLD AUTO: 0.1 THOUSANDS/ΜL (ref 0–0.1)
BASOPHILS NFR BLD AUTO: 1 % (ref 0–1)
BUN SERPL-MCNC: 16 MG/DL (ref 5–25)
CALCIUM SERPL-MCNC: 9.1 MG/DL (ref 8.4–10.2)
CHLORIDE SERPL-SCNC: 101 MMOL/L (ref 97–108)
CO2 SERPL-SCNC: 32 MMOL/L (ref 22–30)
CREAT SERPL-MCNC: 0.74 MG/DL (ref 0.7–1.5)
EOSINOPHIL # BLD AUTO: 0.2 THOUSAND/ΜL (ref 0–0.4)
EOSINOPHIL NFR BLD AUTO: 2 % (ref 0–6)
ERYTHROCYTE [DISTWIDTH] IN BLOOD BY AUTOMATED COUNT: 23.7 %
GFR SERPL CREATININE-BSD FRML MDRD: 99 ML/MIN/1.73SQ M
GLUCOSE SERPL-MCNC: 85 MG/DL (ref 70–99)
HCT VFR BLD AUTO: 38.2 % (ref 41–53)
HGB BLD-MCNC: 12.1 G/DL (ref 13.5–17.5)
LYMPHOCYTES # BLD AUTO: 1.8 THOUSANDS/ΜL (ref 0.5–4)
LYMPHOCYTES NFR BLD AUTO: 27 % (ref 25–45)
MCH RBC QN AUTO: 25.8 PG (ref 26–34)
MCHC RBC AUTO-ENTMCNC: 31.6 G/DL (ref 31–36)
MCV RBC AUTO: 82 FL (ref 80–100)
MONOCYTES # BLD AUTO: 0.6 THOUSAND/ΜL (ref 0.2–0.9)
MONOCYTES NFR BLD AUTO: 9 % (ref 1–10)
NEUTROPHILS # BLD AUTO: 3.9 THOUSANDS/ΜL (ref 1.8–7.8)
NEUTS SEG NFR BLD AUTO: 60 % (ref 45–65)
PLATELET # BLD AUTO: 257 THOUSANDS/UL (ref 150–450)
PMV BLD AUTO: 9.2 FL (ref 8.9–12.7)
POTASSIUM SERPL-SCNC: 3.3 MMOL/L (ref 3.6–5)
RBC # BLD AUTO: 4.67 MILLION/UL (ref 4.5–5.9)
SODIUM SERPL-SCNC: 138 MMOL/L (ref 137–147)
WBC # BLD AUTO: 6.5 THOUSAND/UL (ref 4.5–11)

## 2019-06-02 PROCEDURE — 99232 SBSQ HOSP IP/OBS MODERATE 35: CPT | Performed by: PHYSICIAN ASSISTANT

## 2019-06-02 PROCEDURE — 85025 COMPLETE CBC W/AUTO DIFF WBC: CPT | Performed by: PHYSICIAN ASSISTANT

## 2019-06-02 PROCEDURE — 80048 BASIC METABOLIC PNL TOTAL CA: CPT | Performed by: PHYSICIAN ASSISTANT

## 2019-06-02 RX ORDER — POTASSIUM CHLORIDE 20 MEQ/1
40 TABLET, EXTENDED RELEASE ORAL ONCE
Status: COMPLETED | OUTPATIENT
Start: 2019-06-02 | End: 2019-06-02

## 2019-06-02 RX ADMIN — DONEPEZIL HYDROCHLORIDE 5 MG: 5 TABLET, FILM COATED ORAL at 21:26

## 2019-06-02 RX ADMIN — POTASSIUM CHLORIDE 20 MEQ: 20 TABLET, EXTENDED RELEASE ORAL at 08:45

## 2019-06-02 RX ADMIN — HYDROCHLOROTHIAZIDE 12.5 MG: 12.5 TABLET ORAL at 08:45

## 2019-06-02 RX ADMIN — FERROUS SULFATE TAB 325 MG (65 MG ELEMENTAL FE) 325 MG: 325 (65 FE) TAB at 16:10

## 2019-06-02 RX ADMIN — POTASSIUM CHLORIDE 40 MEQ: 20 TABLET, EXTENDED RELEASE ORAL at 12:08

## 2019-06-02 RX ADMIN — FERROUS SULFATE TAB 325 MG (65 MG ELEMENTAL FE) 325 MG: 325 (65 FE) TAB at 08:45

## 2019-06-02 RX ADMIN — ENOXAPARIN SODIUM 40 MG: 40 INJECTION SUBCUTANEOUS at 08:44

## 2019-06-03 LAB
ANION GAP SERPL CALCULATED.3IONS-SCNC: 4 MMOL/L (ref 5–14)
BASOPHILS # BLD AUTO: 0 THOUSANDS/ΜL (ref 0–0.1)
BASOPHILS NFR BLD AUTO: 0 % (ref 0–1)
BUN SERPL-MCNC: 16 MG/DL (ref 5–25)
CALCIUM SERPL-MCNC: 9.2 MG/DL (ref 8.4–10.2)
CHLORIDE SERPL-SCNC: 102 MMOL/L (ref 97–108)
CO2 SERPL-SCNC: 32 MMOL/L (ref 22–30)
CREAT SERPL-MCNC: 0.71 MG/DL (ref 0.7–1.5)
EOSINOPHIL # BLD AUTO: 0.1 THOUSAND/ΜL (ref 0–0.4)
EOSINOPHIL NFR BLD AUTO: 2 % (ref 0–6)
ERYTHROCYTE [DISTWIDTH] IN BLOOD BY AUTOMATED COUNT: 22.9 %
GFR SERPL CREATININE-BSD FRML MDRD: 101 ML/MIN/1.73SQ M
GLUCOSE SERPL-MCNC: 109 MG/DL (ref 70–99)
HCT VFR BLD AUTO: 37.8 % (ref 41–53)
HGB BLD-MCNC: 12 G/DL (ref 13.5–17.5)
LYMPHOCYTES # BLD AUTO: 1.5 THOUSANDS/ΜL (ref 0.5–4)
LYMPHOCYTES NFR BLD AUTO: 24 % (ref 25–45)
MCH RBC QN AUTO: 26 PG (ref 26–34)
MCHC RBC AUTO-ENTMCNC: 31.8 G/DL (ref 31–36)
MCV RBC AUTO: 82 FL (ref 80–100)
MONOCYTES # BLD AUTO: 0.6 THOUSAND/ΜL (ref 0.2–0.9)
MONOCYTES NFR BLD AUTO: 10 % (ref 1–10)
NEUTROPHILS # BLD AUTO: 4.1 THOUSANDS/ΜL (ref 1.8–7.8)
NEUTS SEG NFR BLD AUTO: 64 % (ref 45–65)
PLATELET # BLD AUTO: 243 THOUSANDS/UL (ref 150–450)
PMV BLD AUTO: 9 FL (ref 8.9–12.7)
POTASSIUM SERPL-SCNC: 3.2 MMOL/L (ref 3.6–5)
RBC # BLD AUTO: 4.62 MILLION/UL (ref 4.5–5.9)
SODIUM SERPL-SCNC: 138 MMOL/L (ref 137–147)
WBC # BLD AUTO: 6.3 THOUSAND/UL (ref 4.5–11)

## 2019-06-03 PROCEDURE — 99232 SBSQ HOSP IP/OBS MODERATE 35: CPT | Performed by: FAMILY MEDICINE

## 2019-06-03 PROCEDURE — 80048 BASIC METABOLIC PNL TOTAL CA: CPT | Performed by: PHYSICIAN ASSISTANT

## 2019-06-03 PROCEDURE — 85025 COMPLETE CBC W/AUTO DIFF WBC: CPT | Performed by: PHYSICIAN ASSISTANT

## 2019-06-03 RX ADMIN — POTASSIUM CHLORIDE 20 MEQ: 20 TABLET, EXTENDED RELEASE ORAL at 08:09

## 2019-06-03 RX ADMIN — DONEPEZIL HYDROCHLORIDE 5 MG: 5 TABLET, FILM COATED ORAL at 21:53

## 2019-06-03 RX ADMIN — FERROUS SULFATE TAB 325 MG (65 MG ELEMENTAL FE) 325 MG: 325 (65 FE) TAB at 08:09

## 2019-06-03 RX ADMIN — HYDROCHLOROTHIAZIDE 12.5 MG: 12.5 TABLET ORAL at 08:09

## 2019-06-03 RX ADMIN — ENOXAPARIN SODIUM 40 MG: 40 INJECTION SUBCUTANEOUS at 08:09

## 2019-06-03 RX ADMIN — FERROUS SULFATE TAB 325 MG (65 MG ELEMENTAL FE) 325 MG: 325 (65 FE) TAB at 16:16

## 2019-06-04 PROBLEM — E87.6 HYPOKALEMIA: Status: ACTIVE | Noted: 2019-06-04

## 2019-06-04 PROCEDURE — 99231 SBSQ HOSP IP/OBS SF/LOW 25: CPT | Performed by: FAMILY MEDICINE

## 2019-06-04 RX ORDER — POTASSIUM CHLORIDE 20 MEQ/1
40 TABLET, EXTENDED RELEASE ORAL DAILY
Status: DISCONTINUED | OUTPATIENT
Start: 2019-06-05 | End: 2019-06-04

## 2019-06-04 RX ORDER — POTASSIUM CHLORIDE 20 MEQ/1
20 TABLET, EXTENDED RELEASE ORAL 2 TIMES DAILY
Status: DISCONTINUED | OUTPATIENT
Start: 2019-06-04 | End: 2019-06-06 | Stop reason: HOSPADM

## 2019-06-04 RX ORDER — LANOLIN ALCOHOL/MO/W.PET/CERES
1000 CREAM (GRAM) TOPICAL DAILY
Status: DISCONTINUED | OUTPATIENT
Start: 2019-06-12 | End: 2019-06-06 | Stop reason: HOSPADM

## 2019-06-04 RX ADMIN — CYANOCOBALAMIN 1000 MCG: 1000 INJECTION INTRAMUSCULAR; SUBCUTANEOUS at 08:45

## 2019-06-04 RX ADMIN — HYDROCHLOROTHIAZIDE 12.5 MG: 12.5 TABLET ORAL at 08:44

## 2019-06-04 RX ADMIN — FERROUS SULFATE TAB 325 MG (65 MG ELEMENTAL FE) 325 MG: 325 (65 FE) TAB at 06:38

## 2019-06-04 RX ADMIN — POTASSIUM CHLORIDE 20 MEQ: 20 TABLET, EXTENDED RELEASE ORAL at 08:44

## 2019-06-04 RX ADMIN — POTASSIUM CHLORIDE 20 MEQ: 20 TABLET, EXTENDED RELEASE ORAL at 17:11

## 2019-06-04 RX ADMIN — DONEPEZIL HYDROCHLORIDE 5 MG: 5 TABLET, FILM COATED ORAL at 22:00

## 2019-06-04 RX ADMIN — ENOXAPARIN SODIUM 40 MG: 40 INJECTION SUBCUTANEOUS at 08:44

## 2019-06-04 RX ADMIN — FERROUS SULFATE TAB 325 MG (65 MG ELEMENTAL FE) 325 MG: 325 (65 FE) TAB at 17:10

## 2019-06-05 LAB
ANION GAP SERPL CALCULATED.3IONS-SCNC: 4 MMOL/L (ref 5–14)
BUN SERPL-MCNC: 15 MG/DL (ref 5–25)
CALCIUM SERPL-MCNC: 9 MG/DL (ref 8.4–10.2)
CHLORIDE SERPL-SCNC: 102 MMOL/L (ref 97–108)
CO2 SERPL-SCNC: 32 MMOL/L (ref 22–30)
CREAT SERPL-MCNC: 0.73 MG/DL (ref 0.7–1.5)
GFR SERPL CREATININE-BSD FRML MDRD: 100 ML/MIN/1.73SQ M
GLUCOSE SERPL-MCNC: 100 MG/DL (ref 70–99)
POTASSIUM SERPL-SCNC: 3.4 MMOL/L (ref 3.6–5)
SODIUM SERPL-SCNC: 138 MMOL/L (ref 137–147)

## 2019-06-05 PROCEDURE — 99231 SBSQ HOSP IP/OBS SF/LOW 25: CPT | Performed by: FAMILY MEDICINE

## 2019-06-05 PROCEDURE — 80048 BASIC METABOLIC PNL TOTAL CA: CPT | Performed by: FAMILY MEDICINE

## 2019-06-05 RX ORDER — POTASSIUM CHLORIDE 20 MEQ/1
20 TABLET, EXTENDED RELEASE ORAL ONCE
Status: COMPLETED | OUTPATIENT
Start: 2019-06-05 | End: 2019-06-05

## 2019-06-05 RX ADMIN — POTASSIUM CHLORIDE 20 MEQ: 20 TABLET, EXTENDED RELEASE ORAL at 08:27

## 2019-06-05 RX ADMIN — FERROUS SULFATE TAB 325 MG (65 MG ELEMENTAL FE) 325 MG: 325 (65 FE) TAB at 16:28

## 2019-06-05 RX ADMIN — POTASSIUM CHLORIDE 20 MEQ: 20 TABLET, EXTENDED RELEASE ORAL at 17:52

## 2019-06-05 RX ADMIN — DONEPEZIL HYDROCHLORIDE 5 MG: 5 TABLET, FILM COATED ORAL at 21:44

## 2019-06-05 RX ADMIN — POTASSIUM CHLORIDE 20 MEQ: 20 TABLET, EXTENDED RELEASE ORAL at 12:09

## 2019-06-05 RX ADMIN — FERROUS SULFATE TAB 325 MG (65 MG ELEMENTAL FE) 325 MG: 325 (65 FE) TAB at 06:39

## 2019-06-05 RX ADMIN — HYDROCHLOROTHIAZIDE 12.5 MG: 12.5 TABLET ORAL at 08:27

## 2019-06-05 RX ADMIN — ENOXAPARIN SODIUM 40 MG: 40 INJECTION SUBCUTANEOUS at 08:27

## 2019-06-06 VITALS
WEIGHT: 208.11 LBS | BODY MASS INDEX: 31.54 KG/M2 | SYSTOLIC BLOOD PRESSURE: 142 MMHG | RESPIRATION RATE: 18 BRPM | HEART RATE: 77 BPM | DIASTOLIC BLOOD PRESSURE: 71 MMHG | HEIGHT: 68 IN | TEMPERATURE: 96.8 F | OXYGEN SATURATION: 100 %

## 2019-06-06 PROCEDURE — 99239 HOSP IP/OBS DSCHRG MGMT >30: CPT | Performed by: FAMILY MEDICINE

## 2019-06-06 RX ORDER — ACETAMINOPHEN 325 MG/1
650 TABLET ORAL EVERY 6 HOURS PRN
Qty: 30 TABLET | Refills: 0
Start: 2019-06-06

## 2019-06-06 RX ORDER — DONEPEZIL HYDROCHLORIDE 5 MG/1
5 TABLET, FILM COATED ORAL
Qty: 30 TABLET | Refills: 0
Start: 2019-06-06 | End: 2019-10-30 | Stop reason: ALTCHOICE

## 2019-06-06 RX ORDER — POTASSIUM CHLORIDE 20 MEQ/1
20 TABLET, EXTENDED RELEASE ORAL 2 TIMES DAILY
Qty: 60 TABLET | Refills: 0
Start: 2019-06-06

## 2019-06-06 RX ORDER — FERROUS SULFATE 325(65) MG
325 TABLET ORAL
Qty: 30 TABLET | Refills: 0
Start: 2019-06-06 | End: 2019-10-30 | Stop reason: ALTCHOICE

## 2019-06-06 RX ORDER — HYDROCHLOROTHIAZIDE 12.5 MG/1
12.5 TABLET ORAL DAILY
Qty: 30 TABLET | Refills: 0
Start: 2019-06-07

## 2019-06-06 RX ADMIN — POTASSIUM CHLORIDE 20 MEQ: 20 TABLET, EXTENDED RELEASE ORAL at 08:10

## 2019-06-06 RX ADMIN — HYDROCHLOROTHIAZIDE 12.5 MG: 12.5 TABLET ORAL at 08:10

## 2019-06-06 RX ADMIN — FERROUS SULFATE TAB 325 MG (65 MG ELEMENTAL FE) 325 MG: 325 (65 FE) TAB at 08:10

## 2019-06-06 RX ADMIN — ENOXAPARIN SODIUM 40 MG: 40 INJECTION SUBCUTANEOUS at 08:10

## 2019-06-06 RX ADMIN — FERROUS SULFATE TAB 325 MG (65 MG ELEMENTAL FE) 325 MG: 325 (65 FE) TAB at 15:40

## 2019-06-10 ENCOUNTER — TRANSCRIBE ORDERS (OUTPATIENT)
Dept: ADMINISTRATIVE | Facility: HOSPITAL | Age: 84
End: 2019-06-10

## 2019-06-10 ENCOUNTER — APPOINTMENT (OUTPATIENT)
Dept: RADIOLOGY | Facility: MEDICAL CENTER | Age: 84
End: 2019-06-10
Payer: COMMERCIAL

## 2019-06-10 DIAGNOSIS — R76.11 POSITIVE PPD: Primary | ICD-10-CM

## 2019-06-10 DIAGNOSIS — R76.11 POSITIVE PPD: ICD-10-CM

## 2019-06-10 PROCEDURE — 71046 X-RAY EXAM CHEST 2 VIEWS: CPT

## 2019-10-30 ENCOUNTER — NURSING HOME VISIT (OUTPATIENT)
Dept: FAMILY MEDICINE CLINIC | Facility: CLINIC | Age: 84
End: 2019-10-30

## 2019-10-30 VITALS
SYSTOLIC BLOOD PRESSURE: 148 MMHG | HEIGHT: 68 IN | DIASTOLIC BLOOD PRESSURE: 91 MMHG | TEMPERATURE: 98.3 F | HEART RATE: 76 BPM | BODY MASS INDEX: 31.64 KG/M2

## 2019-10-30 DIAGNOSIS — I10 ESSENTIAL HYPERTENSION: ICD-10-CM

## 2019-10-30 DIAGNOSIS — E53.8 B12 DEFICIENCY: ICD-10-CM

## 2019-10-30 DIAGNOSIS — L60.2 OVERGROWN TOENAILS: ICD-10-CM

## 2019-10-30 DIAGNOSIS — E87.6 HYPOKALEMIA: ICD-10-CM

## 2019-10-30 DIAGNOSIS — F02.80 LATE ONSET ALZHEIMER'S DISEASE WITHOUT BEHAVIORAL DISTURBANCE (HCC): Primary | ICD-10-CM

## 2019-10-30 DIAGNOSIS — N32.81 OAB (OVERACTIVE BLADDER): ICD-10-CM

## 2019-10-30 DIAGNOSIS — M15.9 OSTEOARTHRITIS OF MULTIPLE JOINTS, UNSPECIFIED OSTEOARTHRITIS TYPE: ICD-10-CM

## 2019-10-30 DIAGNOSIS — R73.03 PRE-DIABETES: ICD-10-CM

## 2019-10-30 DIAGNOSIS — D50.9 IRON DEFICIENCY ANEMIA, UNSPECIFIED IRON DEFICIENCY ANEMIA TYPE: ICD-10-CM

## 2019-10-30 DIAGNOSIS — G30.1 LATE ONSET ALZHEIMER'S DISEASE WITHOUT BEHAVIORAL DISTURBANCE (HCC): Primary | ICD-10-CM

## 2019-10-30 PROBLEM — M19.90 OSTEOARTHRITIS: Status: ACTIVE | Noted: 2019-10-30

## 2019-10-30 PROBLEM — R60.0 LOWER EXTREMITY EDEMA: Status: RESOLVED | Noted: 2019-05-24 | Resolved: 2019-10-30

## 2019-10-30 PROCEDURE — 99336 PR DOM/R-HOME E/M EST PT MOD HI SEVERITY 40 MINUTES: CPT | Performed by: FAMILY MEDICINE

## 2019-10-30 RX ORDER — DONEPEZIL HYDROCHLORIDE 10 MG/1
10 TABLET, FILM COATED ORAL
COMMUNITY

## 2019-10-30 NOTE — ASSESSMENT & PLAN NOTE
-No gross deformities, but nails do appear overgrown on exam today and patient requests he would like them trimmed   -No apparent need for Podiatry referral at this time  -Recommend patient receive regular grooming services, i e , nail trimming/filing

## 2019-10-30 NOTE — LETTER
October 30, 2019     aandb    Patient: Debbie Hoffmann   YOB: 1935   Date of Visit: 10/30/2019       Dear Dr Thompson Grimes: Thank you for referring Debbie Hoffmann to me for evaluation  Below are my notes for this consultation  If you have questions, please do not hesitate to call me  I look forward to following your patient along with you  Sincerely,        Lewis Hopkins MD        CC: No Recipients  Lewis Hopkins MD  10/30/2019  3:38 PM  Attested Addendum                                                             Nursing Home Visit    NAME: Debbie Hoffmann  AGE: 81 y o  SEX: male [id number]    DATE OF ENCOUNTER: 10/30/2019    Nursing home/assisted living [facility]  Patient room number: 180  Code status: Full code  Patient has been legally declared an incapacitated person and a Plenary Guardian of the Emerald Bustamante has been appointed Lala Win )      Assessment and Plan     1  Late onset Alzheimer's disease without behavioral disturbance ([facility] [address] )  Assessment & Plan:  -Patient appears pleasantly confused, not oriented to person, place, or time on exam today with poor recall  -No reported behavioral disturbances per staff   -Denies depression/anxiety, reports stable mood  -PHQ-2 score 0 today  -Continue donepezil 10 mg QD  -HR 76 bpm today; HR 69-84 bpm on review of VS log  -Ongoing monitoring for bradycardia at all visits      2  Essential hypertension  Assessment & Plan:  -/91, at goal today (BP goal <150/90)  -Reviewed BP log, has been at goal overall  -Continue HCTZ 12 5 mg QD, well-tolerated by patient  -Renal function stable June 2019  -Consider to monitor renal function via BMP Q6 months  -Avoid hypotension & severe HTN  -Limit salt-intake & caffeine in diet, minimize stress      3   Hypokalemia  Assessment & Plan:  -K 3 4 on last BMP 6/5/19  -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  -Continue potassium supplementation 20 mEq QD  -Consider to monitor via BMP Q3-6 months  -Plan for BMP 12/2019 (6 months from previous)      4  B12 deficiency  Assessment & Plan:  -B12 142 4/29/19, below goal in the setting of dementia  -Patient asymptomatic of neuropathy symptoms  -Continue B12 replacement via B12 1000 mcg QD      5  Osteoarthritis of multiple joints, unspecified osteoarthritis type  Assessment & Plan:  -Patient asymptomatic at this time  -Symptoms are well-controlled on tylenol  -Continue tylenol 325 mg Q4H PRN for pain  -Ensure total daily dose does not exceed 3g/day      6  Pre-diabetes  Assessment & Plan:  -Last HbA1C 5 9% on 4/30/19  -Encouraged patient to limit dietary carb intake   -Consider obtaining a repeat HbA1C with next lab draw      7  Iron deficiency anemia, unspecified iron deficiency anemia type  Assessment & Plan:  -Normocytic, normochromic anemia with Hb 12 0 6/3/19  -Patient completed iron replacement June 2019  -No evidence of ongoing losses, renal function WNL  -Asymptomatic of fatigue, dizziness, lightheadedness, etc   -Consider to repeat CBC December 2019 (6 months from previous CBC June 2019)      8  Overgrown toenails  Assessment & Plan:  -No gross deformities, but nails do appear overgrown on exam today and patient requests he would like them trimmed   -No apparent need for Podiatry referral at this time  -Recommend patient receive regular grooming services, i e , nail trimming/filing      9  OAB (overactive bladder)  Assessment & Plan:  -Documented hx of OAB with urinary incontinence  -Not currently on any medications for OAB  -Patient denies any urinary symptoms today  -Continue current management via standard UI supplies      Chief Complaint     Routine/initial visit to establish care (new resident)    History of Present Illness     HPI   Patient is a new resident to [facility] who is being seen today as a new patient   He has a hx of late onset dementia on donepezil, anemia (last Hb 12 0 6/3/19) s/p iron supplementation June 2019, HTN on HCTZ with potassium supplementation, OAB with urinary incontinence, vitamin B12 deficiency on daily replacement, and OA which is well-controlled on tylenol  Overall, patient states he is adjusting "fine" to his new location, denies any concerns or issues at this time  Just got his hair cut shortly before today's visit  Eating 2 meals per day with adequate fluid intake per his report, sleeping well, normal UOP/stool pattern, denies pain or mood disturbance  Patient states he grew up in [place] and used to be , has no children, states everyone in his family is "gone" but is unable to elaborate  Not able to state his name, age, location, room number, recent facilities or past hospitals to which he was admitted, making jokes and stating "I don't need to worry about all that, I get where I need to get " Does report he needs someone to come trim his toenails, but otherwise no other complaints today  Denies falls, walks unassisted  Says he has been friendly with some of the other residents but that he has "no interest in all that liquor", referring to socializing at [address] [facility]  Reports he's been spending time with his "neighbor down the [place]" who is "crying all the time that she is lonely and wants to die", says he "takes her around" sometimes because "she is a nice lady" but is unable to state her name  Staff reports patient is a "jokester" but no behavioral disturbances, but he does refuse to have his weight taken  The following portions of the patient's history were reviewed and updated as appropriate: allergies, current medications, past family history, past medical history, past social history, past surgical history and problem list     Review of Systems     Review of Systems   Constitutional: Negative for chills and fever  Eyes: Negative for visual disturbance  Respiratory: Negative for chest tightness and shortness of breath  Cardiovascular: Negative for chest pain, palpitations and leg swelling  Gastrointestinal: Negative for abdominal pain, constipation, diarrhea, nausea and vomiting  Genitourinary: Negative for difficulty urinating and dysuria  Musculoskeletal: Negative for gait problem  Long toenails that "need trimmin'"   Skin: Negative for rash  Neurological: Negative for syncope, weakness and light-headedness  Psychiatric/Behavioral: Negative for agitation, dysphoric mood, self-injury, sleep disturbance and suicidal ideas  All other systems reviewed and are negative  Active Problem List     Patient Active Problem List   Diagnosis    Dementia ([facility] [address] )    History of gastroesophageal reflux (GERD)    Left ventricular hypertrophy by electrocardiogram    Iron deficiency anemia    B12 deficiency    Lower extremity edema    Hypokalemia         Current Medications   Medications reviewed and updated in facility chart  Current Outpatient Medications:     acetaminophen (TYLENOL) 325 mg tablet, Take 2 tablets (650 mg total) by mouth every 6 (six) hours as needed for fever, Disp: 30 tablet, Rfl: 0    cyanocobalamin 1000 MCG tablet, Take 1 tablet (1,000 mcg total) by mouth daily, Disp: 30 tablet, Rfl: 0    donepezil (ARICEPT) 5 mg tablet, Take 1 tablet (5 mg total) by mouth daily at bedtime, Disp: 30 tablet, Rfl: 0    ferrous sulfate 325 (65 Fe) mg tablet, Take 1 tablet (325 mg total) by mouth daily with breakfast, Disp: 30 tablet, Rfl: 0    hydrochlorothiazide (HYDRODIURIL) 12 5 mg tablet, Take 1 tablet (12 5 mg total) by mouth daily, Disp: 30 tablet, Rfl: 0    potassium chloride (K-DUR,KLOR-CON) 20 mEq tablet, Take 1 tablet (20 mEq total) by mouth 2 (two) times a day, Disp: 60 tablet, Rfl: 0     Objective     /91   Pulse 76   Temp 98 3 °F (36 8 °C)   Ht 5' 8" (1 727 m)   BMI 31 64 kg/m²    [Of note, patient refused to have his weight taken per staff]    Physical Exam   Constitutional: He appears well-developed and well-nourished  No distress     HENT:   Head: Normocephalic and atraumatic  Eyes: Conjunctivae and EOM are normal  Right eye exhibits no discharge  Left eye exhibits no discharge  Neck: Normal range of motion  Neck supple  Cardiovascular: Normal rate, regular rhythm and intact distal pulses  Pulmonary/Chest: Effort normal and breath sounds normal  No respiratory distress  Abdominal: Soft  Bowel sounds are normal  There is no tenderness  Genitourinary:   Genitourinary Comments:  exam not done, but suspected scrotal enlargement visible through sweatpants (L>R?)   Musculoskeletal: Normal range of motion  He exhibits no edema or tenderness  Long nails on B/L feet but no gross deformities   Neurological: He is alert  Not oriented to name, birthdate, location, year, month, but able to report he grew up in [place], used to be  and has no children; poor recall, forgets events 10 minutes prior (forgot we met him in the [employer] shop shortly before the interview)  Confabulates and avoids questions he does not know the answer to by making jokes  Gait appears steady without any assistive device   Skin: Skin is warm and dry  No rash noted  Psychiatric: He has a normal mood and affect  His behavior is normal    Pleasant and cooperative, jovial, laughing frequently and joking   Vitals reviewed        Pertinent Laboratory/Diagnostic Studies:  CBC:   Results from Last 12 Months   Lab Units 06/03/19  0457   WBC Thousand/uL 6 30   RBC Million/uL 4 62   HEMOGLOBIN g/dL 12 0*   HEMATOCRIT % 37 8*   MCV fL 82   MCH pg 26 0   MCHC g/dL 31 8   RDW % 22 9*   MPV fL 9 0   PLATELETS Thousands/uL 243   NEUTROS PCT % 64   LYMPHS PCT % 24*   MONOS PCT % 10   EOS PCT % 2   BASOS PCT % 0   NEUTROS ABS Thousands/µL 4 10   LYMPHS ABS Thousands/µL 1 50   MONOS ABS Thousand/µL 0 60   EOS ABS Thousand/µL 0 10     Chemistry Profile:   Results from Last 12 Months   Lab Units 06/05/19  0518  04/29/19  1542   POTASSIUM mmol/L 3 4*   < > 4 0   CHLORIDE mmol/L 102   < > 106 CO2 mmol/L 32*   < > 28   BUN mg/dL 15   < > 16   CREATININE mg/dL 0 73   < > 0 84   GLUCOSE RANDOM mg/dL 100*   < > 136*   CALCIUM mg/dL 9 0   < > 9 4   MAGNESIUM mg/dL  --   --  2 2   AST U/L  --   --  13*   ALT U/L  --   --  16   ALK PHOS U/L  --   --  93   EGFR ml/min/1 73sq m 100   < > 94    < > = values in this interval not displayed  Coagulation Studies:   Results from Last 12 Months   Lab Units 04/29/19  1542   PROTIME seconds 10 5   INR  0 99   PTT seconds 27     Endocrine Studies:   Results from Last 12 Months   Lab Units 04/30/19  0649 04/29/19  1542   HEMOGLOBIN A1C % 5 9  --    TSH 3RD GENERATON uIU/mL  --  1 510     Iron Studies:   Results from Last 12 Months   Lab Units 05/01/19  0449   IRON ug/dL 53*   TIBC ug/dL 308   FERRITIN ng/mL 9  9       PHQ-9 Depression Screening    PHQ-9:    Frequency of the following problems over the past two weeks:       Little interest or pleasure in doing things:  0 - not at all  Feeling down, depressed, or hopeless:  0 - not at all  PHQ-2 Score:  0       LINDSAY Aquino  PGY-3  [facility] signed by Carlton Begum MD at 10/30/2019  4:57 PM:  I discussed case with the resident and reviewed resident note  I was present at assisted living and supervised the resident  I agree with the resident management as it was presented to me  Will order labs for need to restart Iron and recheck B12 level, order Podiatry consult

## 2019-10-30 NOTE — ASSESSMENT & PLAN NOTE
-Patient appears pleasantly confused, not oriented to person, place, or time on exam today with poor recall  -No reported behavioral disturbances per staff   -Denies depression/anxiety, reports stable mood  -PHQ-2 score 0 today  -Continue donepezil 10 mg QD  -HR 76 bpm today; HR 69-84 bpm on review of VS log  -Ongoing monitoring for bradycardia at all visits

## 2019-10-30 NOTE — PROGRESS NOTES
Nursing Home Visit    NAME: Neno Quispe  AGE: 81 y o  SEX: male [id number]    DATE OF ENCOUNTER: 10/30/2019    Nursing home/assisted living [facility]: [facility]  Patient room number: 106  Code status: Full code  Patient has been legally declared an incapacitated person and a Plenary Guardian of the Dc Squires has been appointed Pepper Dane De La Vega )      Assessment and Plan     1  Late onset Alzheimer's disease without behavioral disturbance ([facility] [address] )  Assessment & Plan:  -Patient appears pleasantly confused, not oriented to person, place, or time on exam today with poor recall  -No reported behavioral disturbances per staff   -Denies depression/anxiety, reports stable mood  -PHQ-2 score 0 today  -Continue donepezil 10 mg QD  -HR 76 bpm today; HR 69-84 bpm on review of VS log  -Ongoing monitoring for bradycardia at all visits      2  Essential hypertension  Assessment & Plan:  -/91, at goal today (BP goal <150/90)  -Reviewed BP log, has been at goal overall  -Continue HCTZ 12 5 mg QD, well-tolerated by patient  -Renal function stable June 2019  -Consider to monitor renal function via BMP Q6 months  -Avoid hypotension & severe HTN  -Limit salt-intake & caffeine in diet, minimize stress      3  Hypokalemia  Assessment & Plan:  -K 3 4 on last BMP 6/5/19  -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  -Continue potassium supplementation 20 mEq QD  -Consider to monitor via BMP Q3-6 months  -Plan for BMP 12/2019 (6 months from previous)      4  B12 deficiency  Assessment & Plan:  -B12 142 4/29/19, below goal in the setting of dementia  -Patient asymptomatic of neuropathy symptoms  -Continue B12 replacement via B12 1000 mcg QD      5   Osteoarthritis of multiple joints, unspecified osteoarthritis type  Assessment & Plan:  -Patient asymptomatic at this time  -Symptoms are well-controlled on tylenol  -Continue tylenol 325 mg Q4H PRN for pain  -Ensure total daily dose does not exceed 3g/day      6  Pre-diabetes  Assessment & Plan:  -Last HbA1C 5 9% on 4/30/19  -Encouraged patient to limit dietary carb intake   -Consider obtaining a repeat HbA1C with next lab draw      7  Iron deficiency anemia, unspecified iron deficiency anemia type  Assessment & Plan:  -Normocytic, normochromic anemia with Hb 12 0 6/3/19  -Patient completed iron replacement June 2019  -No evidence of ongoing losses, renal function WNL  -Asymptomatic of fatigue, dizziness, lightheadedness, etc   -Consider to repeat CBC December 2019 (6 months from previous CBC June 2019)      8  Overgrown toenails  Assessment & Plan:  -No gross deformities, but nails do appear overgrown on exam today and patient requests he would like them trimmed   -No apparent need for Podiatry referral at this time  -Recommend patient receive regular grooming services, i e , nail trimming/filing      9  OAB (overactive bladder)  Assessment & Plan:  -Documented hx of OAB with urinary incontinence  -Not currently on any medications for OAB  -Patient denies any urinary symptoms today  -Continue current management via standard UI supplies      Chief Complaint     Routine/initial visit to establish care (new resident)    History of Present Illness     HPI   Patient is a new resident to [facility] who is being seen today as a new patient  He has a hx of late onset dementia on donepezil, anemia (last Hb 12 0 6/3/19) s/p iron supplementation June 2019, HTN on HCTZ with potassium supplementation, OAB with urinary incontinence, vitamin B12 deficiency on daily replacement, and OA which is well-controlled on tylenol  Overall, patient states he is adjusting "fine" to his new location, denies any concerns or issues at this time  Just got his hair cut shortly before today's visit  Eating 2 meals per day with adequate fluid intake per his report, sleeping well, normal UOP/stool pattern, denies pain or mood disturbance   Patient states he grew up in [place] and used to be , has no children, states everyone in his family is "gone" but is unable to elaborate  Not able to state his name, age, location, room number, recent facilities or past hospitals to which he was admitted, making jokes and stating "I don't need to worry about all that, I get where I need to get " Does report he needs someone to come trim his toenails, but otherwise no other complaints today  Denies falls, walks unassisted  Says he has been friendly with some of the other residents but that he has "no interest in all that liquor", referring to socializing at [facility]  Reports he's been spending time with his "neighbor down the [place]" who is "crying all the time that she is lonely and wants to die", says he "takes her around" sometimes because "she is a nice lady" but is unable to state her name  Staff reports patient is a "jokester" but no behavioral disturbances, but he does refuse to have his weight taken  The following portions of the patient's history were reviewed and updated as appropriate: allergies, current medications, past family history, past medical history, past social history, past surgical history and problem list     Review of Systems     Review of Systems   Constitutional: Negative for chills and fever  Eyes: Negative for visual disturbance  Respiratory: Negative for chest tightness and shortness of breath  Cardiovascular: Negative for chest pain, palpitations and leg swelling  Gastrointestinal: Negative for abdominal pain, constipation, diarrhea, nausea and vomiting  Genitourinary: Negative for difficulty urinating and dysuria  Musculoskeletal: Negative for gait problem  Long toenails that "need trimmin'"   Skin: Negative for rash  Neurological: Negative for syncope, weakness and light-headedness  Psychiatric/Behavioral: Negative for agitation, dysphoric mood, self-injury, sleep disturbance and suicidal ideas     All other systems reviewed and are negative  Active Problem List     Patient Active Problem List   Diagnosis    Dementia ([facility] [address] )    History of gastroesophageal reflux (GERD)    Left ventricular hypertrophy by electrocardiogram    Iron deficiency anemia    B12 deficiency    Lower extremity edema    Hypokalemia         Current Medications   Medications reviewed and updated in facility chart  Current Outpatient Medications:     acetaminophen (TYLENOL) 325 mg tablet, Take 2 tablets (650 mg total) by mouth every 6 (six) hours as needed for fever, Disp: 30 tablet, Rfl: 0    cyanocobalamin 1000 MCG tablet, Take 1 tablet (1,000 mcg total) by mouth daily, Disp: 30 tablet, Rfl: 0    donepezil (ARICEPT) 5 mg tablet, Take 1 tablet (5 mg total) by mouth daily at bedtime, Disp: 30 tablet, Rfl: 0    ferrous sulfate 325 (65 Fe) mg tablet, Take 1 tablet (325 mg total) by mouth daily with breakfast, Disp: 30 tablet, Rfl: 0    hydrochlorothiazide (HYDRODIURIL) 12 5 mg tablet, Take 1 tablet (12 5 mg total) by mouth daily, Disp: 30 tablet, Rfl: 0    potassium chloride (K-DUR,KLOR-CON) 20 mEq tablet, Take 1 tablet (20 mEq total) by mouth 2 (two) times a day, Disp: 60 tablet, Rfl: 0     Objective     /91   Pulse 76   Temp 98 3 °F (36 8 °C)   Ht 5' 8" (1 727 m)   BMI 31 64 kg/m²   [Of note, patient refused to have his weight taken per staff]    Physical Exam   Constitutional: He appears well-developed and well-nourished  No distress  HENT:   Head: Normocephalic and atraumatic  Eyes: Conjunctivae and EOM are normal  Right eye exhibits no discharge  Left eye exhibits no discharge  Neck: Normal range of motion  Neck supple  Cardiovascular: Normal rate, regular rhythm and intact distal pulses  Pulmonary/Chest: Effort normal and breath sounds normal  No respiratory distress  Abdominal: Soft  Bowel sounds are normal  There is no tenderness     Genitourinary:   Genitourinary Comments:  exam not done, but suspected scrotal enlargement visible through sweatpants (L>R?)   Musculoskeletal: Normal range of motion  He exhibits no edema or tenderness  Long nails on B/L feet but no gross deformities   Neurological: He is alert  Not oriented to name, birthdate, location, year, month, but able to report he grew up in [place], used to be  and has no children; poor recall, forgets events 10 minutes prior (forgot we met him in the [employer] shop shortly before the interview)  Confabulates and avoids questions he does not know the answer to by making jokes  Gait appears steady without any assistive device   Skin: Skin is warm and dry  No rash noted  Psychiatric: He has a normal mood and affect  His behavior is normal    Pleasant and cooperative, jovial, laughing frequently and joking   Vitals reviewed  Pertinent Laboratory/Diagnostic Studies:  CBC:   Results from Last 12 Months   Lab Units 06/03/19  0457   WBC Thousand/uL 6 30   RBC Million/uL 4 62   HEMOGLOBIN g/dL 12 0*   HEMATOCRIT % 37 8*   MCV fL 82   MCH pg 26 0   MCHC g/dL 31 8   RDW % 22 9*   MPV fL 9 0   PLATELETS Thousands/uL 243   NEUTROS PCT % 64   LYMPHS PCT % 24*   MONOS PCT % 10   EOS PCT % 2   BASOS PCT % 0   NEUTROS ABS Thousands/µL 4 10   LYMPHS ABS Thousands/µL 1 50   MONOS ABS Thousand/µL 0 60   EOS ABS Thousand/µL 0 10     Chemistry Profile:   Results from Last 12 Months   Lab Units 06/05/19  0518  04/29/19  1542   POTASSIUM mmol/L 3 4*   < > 4 0   CHLORIDE mmol/L 102   < > 106   CO2 mmol/L 32*   < > 28   BUN mg/dL 15   < > 16   CREATININE mg/dL 0 73   < > 0 84   GLUCOSE RANDOM mg/dL 100*   < > 136*   CALCIUM mg/dL 9 0   < > 9 4   MAGNESIUM mg/dL  --   --  2 2   AST U/L  --   --  13*   ALT U/L  --   --  16   ALK PHOS U/L  --   --  93   EGFR ml/min/1 73sq m 100   < > 94    < > = values in this interval not displayed       Coagulation Studies:   Results from Last 12 Months   Lab Units 04/29/19  1542   PROTIME seconds 10 5   INR  0 99   PTT seconds 27 Endocrine Studies:   Results from Last 12 Months   Lab Units 04/30/19  0649 04/29/19  1542   HEMOGLOBIN A1C % 5 9  --    TSH 3RD GENERATON uIU/mL  --  1 510     Iron Studies:   Results from Last 12 Months   Lab Units 05/01/19  0449   IRON ug/dL 53*   TIBC ug/dL 308   FERRITIN ng/mL 9  9       PHQ-9 Depression Screening    PHQ-9:    Frequency of the following problems over the past two weeks:       Little interest or pleasure in doing things:  0 - not at all  Feeling down, depressed, or hopeless:  0 - not at all  PHQ-2 Score:  0       LINDSAY Gregg  PGY-3  [place] [id number]

## 2019-10-30 NOTE — ASSESSMENT & PLAN NOTE
-Patient asymptomatic at this time  -Symptoms are well-controlled on tylenol  -Continue tylenol 325 mg Q4H PRN for pain  -Ensure total daily dose does not exceed 3g/day

## 2019-10-30 NOTE — ASSESSMENT & PLAN NOTE
-Documented hx of OAB with urinary incontinence  -Not currently on any medications for OAB  -Patient denies any urinary symptoms today  -Continue current management via standard UI supplies

## 2019-10-30 NOTE — ASSESSMENT & PLAN NOTE
-Last HbA1C 5 9% on 4/30/19  -Encouraged patient to limit dietary carb intake   -Consider obtaining a repeat HbA1C with next lab draw

## 2019-10-30 NOTE — ASSESSMENT & PLAN NOTE
-B12 142 4/29/19, below goal in the setting of dementia  -Patient asymptomatic of neuropathy symptoms  -Continue B12 replacement via B12 1000 mcg QD

## 2019-10-30 NOTE — ASSESSMENT & PLAN NOTE
-Normocytic, normochromic anemia with Hb 12 0 6/3/19  -Patient completed iron replacement June 2019  -No evidence of ongoing losses, renal function WNL  -Asymptomatic of fatigue, dizziness, lightheadedness, etc   -Consider to repeat CBC December 2019 (6 months from previous CBC June 2019)

## 2019-10-30 NOTE — ASSESSMENT & PLAN NOTE
-/91, at goal today (BP goal <150/90)  -Reviewed BP log, has been at goal overall  -Continue HCTZ 12 5 mg QD, well-tolerated by patient  -Renal function stable June 2019  -Consider to monitor renal function via BMP Q6 months  -Avoid hypotension & severe HTN  -Limit salt-intake & caffeine in diet, minimize stress

## 2019-10-30 NOTE — ASSESSMENT & PLAN NOTE
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

## 2019-12-18 ENCOUNTER — NURSING HOME VISIT (OUTPATIENT)
Dept: FAMILY MEDICINE CLINIC | Facility: CLINIC | Age: 84
End: 2019-12-18

## 2019-12-18 VITALS
DIASTOLIC BLOOD PRESSURE: 91 MMHG | BODY MASS INDEX: 33.36 KG/M2 | WEIGHT: 219.4 LBS | SYSTOLIC BLOOD PRESSURE: 129 MMHG | HEART RATE: 74 BPM

## 2019-12-18 DIAGNOSIS — M54.2 CERVICALGIA: ICD-10-CM

## 2019-12-18 DIAGNOSIS — D50.9 IRON DEFICIENCY ANEMIA, UNSPECIFIED IRON DEFICIENCY ANEMIA TYPE: ICD-10-CM

## 2019-12-18 DIAGNOSIS — G30.1 LATE ONSET ALZHEIMER'S DISEASE WITHOUT BEHAVIORAL DISTURBANCE (HCC): Primary | ICD-10-CM

## 2019-12-18 DIAGNOSIS — R73.03 PRE-DIABETES: ICD-10-CM

## 2019-12-18 DIAGNOSIS — F02.80 LATE ONSET ALZHEIMER'S DISEASE WITHOUT BEHAVIORAL DISTURBANCE (HCC): Primary | ICD-10-CM

## 2019-12-18 DIAGNOSIS — M15.9 OSTEOARTHRITIS OF MULTIPLE JOINTS, UNSPECIFIED OSTEOARTHRITIS TYPE: ICD-10-CM

## 2019-12-18 DIAGNOSIS — N32.81 OAB (OVERACTIVE BLADDER): ICD-10-CM

## 2019-12-18 DIAGNOSIS — I10 ESSENTIAL HYPERTENSION: ICD-10-CM

## 2019-12-18 PROCEDURE — 99336 PR DOM/R-HOME E/M EST PT MOD HI SEVERITY 40 MINUTES: CPT | Performed by: FAMILY MEDICINE

## 2019-12-18 NOTE — ASSESSMENT & PLAN NOTE
-Last HbA1C 5 9% on 4/30/19  -Glucose 92 on 10/31/19 labs  -May consider repeating HbA1C with next labs May 2020  -Encouraged to limit dietary carb intake

## 2019-12-18 NOTE — LETTER
December 18, 2019     [facility]    Patient: Anurag Talbert   YOB: 1935   Date of Visit: 12/18/2019       Dear Dr Emilia Haines Recipients: Thank you for referring Anurag Talbert to me for evaluation  Below are my notes for this consultation  If you have questions, please do not hesitate to call me  I look forward to following your patient along with you  Sincerely,        Jazmyn Lopez MD        CC: No Recipients  Jazmyn Lopez MD  12/18/2019  2:26 PM  Attested Addendum                                                             Nursing Home Visit    NAME: Anurag Talbert  AGE: 81 y o  SEX: male [id number]    DATE OF ENCOUNTER: 12/18/2019    Nursing home/assisted living name: [facility]  Patient room number: 067  Code status: Full code   Patient has been legally declared an incapacitated person and a Mary Distel of the [employer] has been appointed Shirley Bryant )    Assessment and Plan     Problem List Items Addressed This Visit        Cardiovascular and Mediastinum    Essential hypertension     -BP 1 29/91, at goal today (BP goal <150/90)  -Reviewed BP log, has been at goal overall  -Continue HCTZ 12 5 mg QD, well-tolerated by patient  -Cr 0 86, K 3 5 on 10/31/19 labs  -Previous hypokalemia resolved but remains at lower limit of normal; likely 2/2 thiazide usage  -Continue potassium supplementation 20 mEq QD  -Recommend BMP Q6 months (repeat May 2020)            Nervous and Auditory    Dementia ([facility] [address] ) - Primary     -Patient stable from previous visit, remains pleasantly confused with stable mood, denies depression or anxiety symptoms  -AOx1 on exam today with poor recall; no behavioral disturbances  -Continue donepezil 10 mg QD  -Donepezil well-tolerated, with no bradycardia per review of VS log  -Continue ongoing monitoring for bradycardia at all visits  -Previous B12 deficiency resolved (B12 142 4/2019, improved to 776 10/31/19); continue B12 replacement via B12 1000 mcg QD  -Consider repeat B12 level in 6 months from previous (May 2020)            Musculoskeletal and Integument    Osteoarthritis     -Asymptomatic of OA of knees, but reports neck pain as noted below  -Continue tylenol 325 mg Q4H PRN for pain (TDD<3g/day)            Genitourinary    OAB (overactive bladder)     -Documented hx of OAB with urinary incontinence  -Not currently on any medications for OAB  -Patient denies any urinary symptoms today  -Continue current management via standard UI supplies            Other    Iron deficiency anemia     -Patient with previous normocytic, normochromic anemia in April 2019, prompting initiation of ferrous sulfate at last visit 10/31/19; well-tolerated, denies constipation  -Repeat labs 10/31/19 showed Hb 14 7, ferritin 29  -No evidence of ongoing losses, renal function WNL  -Plan to repeat CBC in February 2020 (at next visit)         Pre-diabetes     -Last HbA1C 5 9% on 4/30/19  -Glucose 92 on 10/31/19 labs  -May consider repeating HbA1C with next labs May 2020  -Encouraged to limit dietary carb intake          Cervicalgia     -Documented hx of cervicalgia, patient reporting mild neck pain, intermittent, mild hypertonicity on exam but non-tender   -Suspect patient forgets he has option to ask for tylenol  -Encouraged patient to report neck pain to nursing staff so they may give him his PRN tylenol dosing; pain not severe enough to warrant scheduled tylenol at this time  -Continue tylenol 325 mg Q4H PRN for pain (TDD<3g/day)  -Will add voltaren gel 4x/day PRN for neck pain                 Chief Complaint     Follow up nursing home visit    History of Present Illness     HPI    Patient seen and examined in his room today watching [employer]  No complaints other than some mild neck pain that bothers him at night; he has tylenol ordered PRN but believes he has not had any   Otherwise, he says he is "in good spirits", eating and drinking well, doesn't like to socialize very much with the other residents but says "I get around when I want to " No issues since starting iron supplements at last visit  Had bloodwork 10/31/19 that showed Hb 14 7 but ferritin 29, B12 776, glucose 92, Cr 0 86, K 3 5, otherwise WNL  Tolerating ferrous sulfate well, denies constipation  No other issues or complaints today  No behavioral disturbances per staff  The following portions of the patient's history were reviewed and updated as appropriate: allergies, current medications, past family history, past medical history, past social history, past surgical history and problem list     Review of Systems     Review of Systems   Constitutional: Negative for chills, fatigue and fever  Eyes: Negative for visual disturbance  Respiratory: Negative for chest tightness and shortness of breath  Cardiovascular: Negative for chest pain, palpitations and leg swelling  Gastrointestinal: Negative for abdominal pain, constipation, diarrhea, nausea and vomiting  Genitourinary: Negative for dysuria  OAB symptoms stable, denies any issues   Musculoskeletal: Positive for neck pain  Negative for arthralgias and gait problem  Reports overgrown toenails resolved, currently "neat and trim"   Skin: Negative for rash  Neurological: Negative for syncope, weakness and light-headedness  Psychiatric/Behavioral: Positive for confusion  Negative for agitation, behavioral problems, dysphoric mood, hallucinations, self-injury, sleep disturbance and suicidal ideas  The patient is not nervous/anxious  All other systems reviewed and are negative        Active Problem List     Patient Active Problem List   Diagnosis    Dementia ([facility] [address] )    History of gastroesophageal reflux (GERD)    Left ventricular hypertrophy by electrocardiogram    Iron deficiency anemia    B12 deficiency    Hypokalemia    Essential hypertension    Overgrown toenails    Osteoarthritis    Pre-diabetes    OAB (overactive bladder) Current Medications   Medications reviewed and updated in facility chart  Current Outpatient Medications:     acetaminophen (TYLENOL) 325 mg tablet, Take 2 tablets (650 mg total) by mouth every 6 (six) hours as needed for fever, Disp: 30 tablet, Rfl: 0    cyanocobalamin 1000 MCG tablet, Take 1 tablet (1,000 mcg total) by mouth daily, Disp: 30 tablet, Rfl: 0    donepezil (ARICEPT) 10 mg tablet, Take 10 mg by mouth daily at bedtime, Disp: , Rfl:     hydrochlorothiazide (HYDRODIURIL) 12 5 mg tablet, Take 1 tablet (12 5 mg total) by mouth daily, Disp: 30 tablet, Rfl: 0    potassium chloride (K-DUR,KLOR-CON) 20 mEq tablet, Take 1 tablet (20 mEq total) by mouth 2 (two) times a day, Disp: 60 tablet, Rfl: 0     Objective     /91   Pulse 74   Wt 99 5 kg (219 lb 6 4 oz)   BMI 33 36 kg/m²      Physical Exam   Constitutional: He appears well-developed and well-nourished  No distress  HENT:   Head: Normocephalic and atraumatic  Eyes: Conjunctivae are normal  Right eye exhibits no discharge  Left eye exhibits no discharge  Neck: Normal range of motion  Neck supple  Mild hypertonicity of posterior cervical paravertebral musculature without tenderness to palpation   Cardiovascular: Normal rate, regular rhythm and intact distal pulses  Pulmonary/Chest: Effort normal and breath sounds normal  No respiratory distress  Abdominal: Soft  Bowel sounds are normal  There is no tenderness  Musculoskeletal: Normal range of motion  He exhibits no edema  Neurological: He is alert  Oriented to self only   Skin: Skin is warm and dry  Capillary refill takes less than 2 seconds  No rash noted  Psychiatric: He has a normal mood and affect  His behavior is normal    Vitals reviewed        Pertinent Laboratory/Diagnostic Studies from 10/31/19:  CBC: Hb 14 7, ferritin 29  B12: 776  BMP: glucose 92, Cr 0 86, K 3 5    Attestation signed by Jordan Fletcher MD at 12/18/2019  3:01 PM:  I discussed case with the resident and reviewed resident note  I was present at assisted living and supervised the resident  I agree with the resident management as it was presented to me  Continue HCTZ 12 5 mg and KCl 40mEq daily, start Voltaren to neck area

## 2019-12-18 NOTE — ASSESSMENT & PLAN NOTE
-/91, at goal today (BP goal <150/90)  -Reviewed BP log, has been at goal overall  -Continue HCTZ 12 5 mg QD, well-tolerated by patient  -Cr 0 86, K 3 5 on 10/31/19 labs  -Previous hypokalemia resolved but remains at lower limit of normal; likely 2/2 thiazide usage  -Continue potassium supplementation 20 mEq QD  -Recommend BMP Q6 months (repeat May 2020)

## 2019-12-18 NOTE — LETTER
December 18, 2019     [facility]    Patient: Bao Bear   YOB: 1935   Date of Visit: 12/18/2019       Dear Dr John Jon Recipients: Thank you for referring Bao Bear to me for evaluation  Below are my notes for this consultation  If you have questions, please do not hesitate to call me  I look forward to following your patient along with you  Sincerely,        Doris Flores MD        CC: No Recipients  Doris Flores MD  12/18/2019  2:26 PM  Attested Addendum                                                             Nursing Home Visit    NAME: Bao Bear  AGE: 81 y o  SEX: male [id number]    DATE OF ENCOUNTER: 12/18/2019    Nursing home/assisted living name: [facility]  Patient room number: 600  Code status: Full code   Patient has been legally declared an incapacitated person and a Sheral Manifold of the [employer] has been appointed Steven Novak )    Assessment and Plan     Problem List Items Addressed This Visit        Cardiovascular and Mediastinum    Essential hypertension     -BP 1 29/91, at goal today (BP goal <150/90)  -Reviewed BP log, has been at goal overall  -Continue HCTZ 12 5 mg QD, well-tolerated by patient  -Cr 0 86, K 3 5 on 10/31/19 labs  -Previous hypokalemia resolved but remains at lower limit of normal; likely 2/2 thiazide usage  -Continue potassium supplementation 20 mEq QD  -Recommend BMP Q6 months (repeat May 2020)            Nervous and Auditory    Dementia ([facility] [address] ) - Primary     -Patient stable from previous visit, remains pleasantly confused with stable mood, denies depression or anxiety symptoms  -AOx1 on exam today with poor recall; no behavioral disturbances  -Continue donepezil 10 mg QD  -Donepezil well-tolerated, with no bradycardia per review of VS log  -Continue ongoing monitoring for bradycardia at all visits  -Previous B12 deficiency resolved (B12 142 4/2019, improved to 776 10/31/19); continue B12 replacement via B12 1000 mcg QD  -Consider repeat B12 level in 6 months from previous (May 2020)            Musculoskeletal and Integument    Osteoarthritis     -Asymptomatic of OA of knees, but reports neck pain as noted below  -Continue tylenol 325 mg Q4H PRN for pain (TDD<3g/day)            Genitourinary    OAB (overactive bladder)     -Documented hx of OAB with urinary incontinence  -Not currently on any medications for OAB  -Patient denies any urinary symptoms today  -Continue current management via standard UI supplies            Other    Iron deficiency anemia     -Patient with previous normocytic, normochromic anemia in April 2019, prompting initiation of ferrous sulfate at last visit 10/31/19; well-tolerated, denies constipation  -Repeat labs 10/31/19 showed Hb 14 7, ferritin 29  -No evidence of ongoing losses, renal function WNL  -Plan to repeat CBC in February 2020 (at next visit)         Pre-diabetes     -Last HbA1C 5 9% on 4/30/19  -Glucose 92 on 10/31/19 labs  -May consider repeating HbA1C with next labs May 2020  -Encouraged to limit dietary carb intake          Cervicalgia     -Documented hx of cervicalgia, patient reporting mild neck pain, intermittent, mild hypertonicity on exam but non-tender   -Suspect patient forgets he has option to ask for tylenol  -Encouraged patient to report neck pain to nursing staff so they may give him his PRN tylenol dosing; pain not severe enough to warrant scheduled tylenol at this time  -Continue tylenol 325 mg Q4H PRN for pain (TDD<3g/day)  -Will add voltaren gel 4x/day PRN for neck pain                 Chief Complaint     Follow up nursing home visit    History of Present Illness     HPI    Patient seen and examined in his room today watching [employer]  No complaints other than some mild neck pain that bothers him at night; he has tylenol ordered PRN but believes he has not had any   Otherwise, he says he is "in good spirits", eating and drinking well, doesn't like to socialize very much with the other residents but says "I get around when I want to " No issues since starting iron supplements at last visit  Had bloodwork 10/31/19 that showed Hb 14 7 but ferritin 29, B12 776, glucose 92, Cr 0 86, K 3 5, otherwise WNL  Tolerating ferrous sulfate well, denies constipation  No other issues or complaints today  No behavioral disturbances per staff  The following portions of the patient's history were reviewed and updated as appropriate: allergies, current medications, past family history, past medical history, past social history, past surgical history and problem list     Review of Systems     Review of Systems   Constitutional: Negative for chills, fatigue and fever  Eyes: Negative for visual disturbance  Respiratory: Negative for chest tightness and shortness of breath  Cardiovascular: Negative for chest pain, palpitations and leg swelling  Gastrointestinal: Negative for abdominal pain, constipation, diarrhea, nausea and vomiting  Genitourinary: Negative for dysuria  OAB symptoms stable, denies any issues   Musculoskeletal: Positive for neck pain  Negative for arthralgias and gait problem  Reports overgrown toenails resolved, currently "neat and trim"   Skin: Negative for rash  Neurological: Negative for syncope, weakness and light-headedness  Psychiatric/Behavioral: Positive for confusion  Negative for agitation, behavioral problems, dysphoric mood, hallucinations, self-injury, sleep disturbance and suicidal ideas  The patient is not nervous/anxious  All other systems reviewed and are negative        Active Problem List     Patient Active Problem List   Diagnosis    Dementia ([facility] [address] )    History of gastroesophageal reflux (GERD)    Left ventricular hypertrophy by electrocardiogram    Iron deficiency anemia    B12 deficiency    Hypokalemia    Essential hypertension    Overgrown toenails    Osteoarthritis    Pre-diabetes    OAB (overactive bladder) Current Medications   Medications reviewed and updated in facility chart  Current Outpatient Medications:     acetaminophen (TYLENOL) 325 mg tablet, Take 2 tablets (650 mg total) by mouth every 6 (six) hours as needed for fever, Disp: 30 tablet, Rfl: 0    cyanocobalamin 1000 MCG tablet, Take 1 tablet (1,000 mcg total) by mouth daily, Disp: 30 tablet, Rfl: 0    donepezil (ARICEPT) 10 mg tablet, Take 10 mg by mouth daily at bedtime, Disp: , Rfl:     hydrochlorothiazide (HYDRODIURIL) 12 5 mg tablet, Take 1 tablet (12 5 mg total) by mouth daily, Disp: 30 tablet, Rfl: 0    potassium chloride (K-DUR,KLOR-CON) 20 mEq tablet, Take 1 tablet (20 mEq total) by mouth 2 (two) times a day, Disp: 60 tablet, Rfl: 0     Objective     /91   Pulse 74   Wt 99 5 kg (219 lb 6 4 oz)   BMI 33 36 kg/m²      Physical Exam   Constitutional: He appears well-developed and well-nourished  No distress  HENT:   Head: Normocephalic and atraumatic  Eyes: Conjunctivae are normal  Right eye exhibits no discharge  Left eye exhibits no discharge  Neck: Normal range of motion  Neck supple  Mild hypertonicity of posterior cervical paravertebral musculature without tenderness to palpation   Cardiovascular: Normal rate, regular rhythm and intact distal pulses  Pulmonary/Chest: Effort normal and breath sounds normal  No respiratory distress  Abdominal: Soft  Bowel sounds are normal  There is no tenderness  Musculoskeletal: Normal range of motion  He exhibits no edema  Neurological: He is alert  Oriented to self only   Skin: Skin is warm and dry  Capillary refill takes less than 2 seconds  No rash noted  Psychiatric: He has a normal mood and affect  His behavior is normal    Vitals reviewed        Pertinent Laboratory/Diagnostic Studies from 10/31/19:  CBC: Hb 14 7, ferritin 29  B12: 776  BMP: glucose 92, Cr 0 86, K 3 5    Attestation signed by Piter Saenz MD at 12/18/2019  3:01 PM:  I discussed case with the resident and reviewed resident note  I was present at assisted living and supervised the resident  I agree with the resident management as it was presented to me  Continue HCTZ 12 5 mg and KCl 40mEq daily, start Voltaren to neck area

## 2019-12-18 NOTE — ASSESSMENT & PLAN NOTE
-Patient with previous normocytic, normochromic anemia in April 2019, prompting initiation of ferrous sulfate at last visit 10/31/19; well-tolerated, denies constipation  -Repeat labs 10/31/19 showed Hb 14 7, ferritin 29  -No evidence of ongoing losses, renal function WNL  -Plan to repeat CBC in February 2020 (at next visit)

## 2019-12-18 NOTE — LETTER
December 18, 2019     ab    Patient: Frida Wayne   YOB: 1935   Date of Visit: 12/18/2019       Dear Dr Karrie Harris Recipients: Thank you for referring Frida Wayne to me for evaluation  Below are my notes for this consultation  If you have questions, please do not hesitate to call me  I look forward to following your patient along with you  Sincerely,        Venecia Rodrigues MD        CC: No Recipients  Venecia Rodrigues MD  12/18/2019  2:26 PM  Attested Addendum                                                             Nursing Home Visit    NAME: Frida Wayne  AGE: 81 y o  SEX: male [id number]    DATE OF ENCOUNTER: 12/18/2019    Nursing home/assisted living name: [facility] and [facility]  Patient room number: 174  Code status: Full code   Patient has been legally declared an incapacitated person and a Lawayne Spanner of the [facility] has been appointed Liana Nguyen )    Assessment and Plan     Problem List Items Addressed This Visit        Cardiovascular and Mediastinum    Essential hypertension     -BP 1 29/91, at goal today (BP goal <150/90)  -Reviewed BP log, has been at goal overall  -Continue HCTZ 12 5 mg QD, well-tolerated by patient  -Cr 0 86, K 3 5 on 10/31/19 labs  -Previous hypokalemia resolved but remains at lower limit of normal; likely 2/2 thiazide usage  -Continue potassium supplementation 20 mEq QD  -Recommend BMP Q6 months (repeat May 2020)            Nervous and Auditory    Dementia ([facility] [address] ) - Primary     -Patient stable from previous visit, remains pleasantly confused with stable mood, denies depression or anxiety symptoms  -AOx1 on exam today with poor recall; no behavioral disturbances  -Continue donepezil 10 mg QD  -Donepezil well-tolerated, with no bradycardia per review of VS log  -Continue ongoing monitoring for bradycardia at all visits  -Previous B12 deficiency resolved (B12 142 4/2019, improved to 776 10/31/19); continue B12 replacement via B12 1000 mcg QD  -Consider repeat B12 level in 6 months from previous (May 2020)            Musculoskeletal and Integument    Osteoarthritis     -Asymptomatic of OA of knees, but reports neck pain as noted below  -Continue tylenol 325 mg Q4H PRN for pain (TDD<3g/day)            Genitourinary    OAB (overactive bladder)     -Documented hx of OAB with urinary incontinence  -Not currently on any medications for OAB  -Patient denies any urinary symptoms today  -Continue current management via standard UI supplies            Other    Iron deficiency anemia     -Patient with previous normocytic, normochromic anemia in April 2019, prompting initiation of ferrous sulfate at last visit 10/31/19; well-tolerated, denies constipation  -Repeat labs 10/31/19 showed Hb 14 7, ferritin 29  -No evidence of ongoing losses, renal function WNL  -Plan to repeat CBC in February 2020 (at next visit)         Pre-diabetes     -Last HbA1C 5 9% on 4/30/19  -Glucose 92 on 10/31/19 labs  -May consider repeating HbA1C with next labs May 2020  -Encouraged to limit dietary carb intake          Cervicalgia     -Documented hx of cervicalgia, patient reporting mild neck pain, intermittent, mild hypertonicity on exam but non-tender   -Suspect patient forgets he has option to ask for tylenol  -Encouraged patient to report neck pain to nursing staff so they may give him his PRN tylenol dosing; pain not severe enough to warrant scheduled tylenol at this time  -Continue tylenol 325 mg Q4H PRN for pain (TDD<3g/day)  -Will add voltaren gel 4x/day PRN for neck pain                 Chief Complaint     Follow up nursing home visit    History of Present Illness     HPI    Patient seen and examined in his room today watching [employer]  No complaints other than some mild neck pain that bothers him at night; he has tylenol ordered PRN but believes he has not had any   Otherwise, he says he is "in good spirits", eating and drinking well, doesn't like to socialize very much with the other residents but says "I get around when I want to " No issues since starting iron supplements at last visit  Had bloodwork 10/31/19 that showed Hb 14 7 but ferritin 29, B12 776, glucose 92, Cr 0 86, K 3 5, otherwise WNL  Tolerating ferrous sulfate well, denies constipation  No other issues or complaints today  No behavioral disturbances per staff  The following portions of the patient's history were reviewed and updated as appropriate: allergies, current medications, past family history, past medical history, past social history, past surgical history and problem list     Review of Systems     Review of Systems   Constitutional: Negative for chills, fatigue and fever  Eyes: Negative for visual disturbance  Respiratory: Negative for chest tightness and shortness of breath  Cardiovascular: Negative for chest pain, palpitations and leg swelling  Gastrointestinal: Negative for abdominal pain, constipation, diarrhea, nausea and vomiting  Genitourinary: Negative for dysuria  OAB symptoms stable, denies any issues   Musculoskeletal: Positive for neck pain  Negative for arthralgias and gait problem  Reports overgrown toenails resolved, currently "neat and trim"   Skin: Negative for rash  Neurological: Negative for syncope, weakness and light-headedness  Psychiatric/Behavioral: Positive for confusion  Negative for agitation, behavioral problems, dysphoric mood, hallucinations, self-injury, sleep disturbance and suicidal ideas  The patient is not nervous/anxious  All other systems reviewed and are negative        Active Problem List     Patient Active Problem List   Diagnosis    [facility] ([facility] [address] )    History of gastroesophageal reflux (GERD)    Left ventricular hypertrophy by electrocardiogram    Iron deficiency anemia    B12 deficiency    Hypokalemia    Essential hypertension    Overgrown toenails    Osteoarthritis    Pre-diabetes    OAB (overactive bladder)         Current Medications   Medications reviewed and updated in facility chart  Current Outpatient Medications:     acetaminophen (TYLENOL) 325 mg tablet, Take 2 tablets (650 mg total) by mouth every 6 (six) hours as needed for fever, Disp: 30 tablet, Rfl: 0    cyanocobalamin 1000 MCG tablet, Take 1 tablet (1,000 mcg total) by mouth daily, Disp: 30 tablet, Rfl: 0    donepezil (ARICEPT) 10 mg tablet, Take 10 mg by mouth daily at bedtime, Disp: , Rfl:     hydrochlorothiazide (HYDRODIURIL) 12 5 mg tablet, Take 1 tablet (12 5 mg total) by mouth daily, Disp: 30 tablet, Rfl: 0    potassium chloride (K-DUR,KLOR-CON) 20 mEq tablet, Take 1 tablet (20 mEq total) by mouth 2 (two) times a day, Disp: 60 tablet, Rfl: 0     Objective     /91   Pulse 74   Wt 99 5 kg (219 lb 6 4 oz)   BMI 33 36 kg/m²      Physical Exam   Constitutional: He appears well-developed and well-nourished  No distress  HENT:   Head: Normocephalic and atraumatic  Eyes: Conjunctivae are normal  Right eye exhibits no discharge  Left eye exhibits no discharge  Neck: Normal range of motion  Neck supple  Mild hypertonicity of posterior cervical paravertebral musculature without tenderness to palpation   Cardiovascular: Normal rate, regular rhythm and intact distal pulses  Pulmonary/Chest: Effort normal and breath sounds normal  No respiratory distress  Abdominal: Soft  Bowel sounds are normal  There is no tenderness  Musculoskeletal: Normal range of motion  He exhibits no edema  Neurological: He is alert  Oriented to self only   Skin: Skin is warm and dry  Capillary refill takes less than 2 seconds  No rash noted  Psychiatric: He has a normal mood and affect  His behavior is normal    Vitals reviewed        Pertinent Laboratory/Diagnostic Studies from 10/31/19:  CBC: Hb 14 7, ferritin 29  B12: 776  BMP: glucose 92, Cr 0 86, K 3 5    Attestation signed by Ford Garcia MD at 12/18/2019  3:01 PM:  I discussed case with the resident and reviewed resident note  I was present at assisted living and supervised the resident  I agree with the resident management as it was presented to me  Continue HCTZ 12 5 mg and KCl 40mEq daily, start Voltaren to neck area

## 2019-12-18 NOTE — ASSESSMENT & PLAN NOTE
-Documented hx of cervicalgia, patient reporting mild neck pain, intermittent, mild hypertonicity on exam but non-tender   -Suspect patient forgets he has option to ask for tylenol  -Encouraged patient to report neck pain to nursing staff so they may give him his PRN tylenol dosing; pain not severe enough to warrant scheduled tylenol at this time  -Continue tylenol 325 mg Q4H PRN for pain (TDD<3g/day)  -Will add voltaren gel 4x/day PRN for neck pain

## 2019-12-18 NOTE — ASSESSMENT & PLAN NOTE
-Patient stable from previous visit, remains pleasantly confused with stable mood, denies depression or anxiety symptoms  -AOx1 on exam today with poor recall; no behavioral disturbances  -Continue donepezil 10 mg QD  -Donepezil well-tolerated, with no bradycardia per review of VS log  -Continue ongoing monitoring for bradycardia at all visits  -Previous B12 deficiency resolved (B12 142 4/2019, improved to 776 10/31/19); continue B12 replacement via B12 1000 mcg QD  -Consider repeat B12 level in 6 months from previous (May 2020)

## 2019-12-18 NOTE — ASSESSMENT & PLAN NOTE
-Asymptomatic of OA of knees, but reports neck pain as noted below  -Continue tylenol 325 mg Q4H PRN for pain (TDD<3g/day)

## 2019-12-19 DIAGNOSIS — M54.2 CERVICALGIA: Primary | ICD-10-CM

## 2019-12-30 DIAGNOSIS — M54.2 CERVICALGIA: ICD-10-CM

## 2020-02-26 ENCOUNTER — NURSING HOME VISIT (OUTPATIENT)
Dept: FAMILY MEDICINE CLINIC | Facility: CLINIC | Age: 85
End: 2020-02-26

## 2020-02-26 VITALS
HEART RATE: 70 BPM | SYSTOLIC BLOOD PRESSURE: 121 MMHG | DIASTOLIC BLOOD PRESSURE: 93 MMHG | BODY MASS INDEX: 33.04 KG/M2 | WEIGHT: 218 LBS | HEIGHT: 68 IN | TEMPERATURE: 98.1 F

## 2020-02-26 DIAGNOSIS — I10 ESSENTIAL HYPERTENSION: ICD-10-CM

## 2020-02-26 DIAGNOSIS — R73.03 PRE-DIABETES: ICD-10-CM

## 2020-02-26 DIAGNOSIS — M54.2 CERVICALGIA: ICD-10-CM

## 2020-02-26 DIAGNOSIS — G30.1 LATE ONSET ALZHEIMER'S DISEASE WITHOUT BEHAVIORAL DISTURBANCE (HCC): Primary | ICD-10-CM

## 2020-02-26 DIAGNOSIS — M15.9 OSTEOARTHRITIS OF MULTIPLE JOINTS, UNSPECIFIED OSTEOARTHRITIS TYPE: ICD-10-CM

## 2020-02-26 DIAGNOSIS — N32.81 OAB (OVERACTIVE BLADDER): ICD-10-CM

## 2020-02-26 DIAGNOSIS — D50.9 IRON DEFICIENCY ANEMIA, UNSPECIFIED IRON DEFICIENCY ANEMIA TYPE: ICD-10-CM

## 2020-02-26 DIAGNOSIS — F02.80 LATE ONSET ALZHEIMER'S DISEASE WITHOUT BEHAVIORAL DISTURBANCE (HCC): Primary | ICD-10-CM

## 2020-02-26 DIAGNOSIS — R09.81 NASAL CONGESTION: ICD-10-CM

## 2020-02-26 PROCEDURE — 99336 PR DOM/R-HOME E/M EST PT MOD HI SEVERITY 40 MINUTES: CPT | Performed by: FAMILY MEDICINE

## 2020-02-26 NOTE — ASSESSMENT & PLAN NOTE
-No evidence of nasal congestion on exam today  -Possibly turbinate swelling 2/2 allergic/vasomotor rhinitis  -May be due to dust exposure vs  seasonal allergies  -Start flonase intranasal spray QD

## 2020-02-26 NOTE — ASSESSMENT & PLAN NOTE
-Patient asymptomatic at this time  -Mild hypertonicity on exam but non-tender   -Continue tylenol 325 mg Q4H PRN for pain (TDD<3g/day)  -Continue voltaren gel 4x/day PRN for neck pain

## 2020-02-26 NOTE — ASSESSMENT & PLAN NOTE
-Normocytic, normochromic anemia in April 2019  -Taking ferrous sulfate 325 mg QD for past 4 months  -Iron supplement well-tolerated, denies constipation  -Labs 10/31/19 showed Hb 14 7, ferritin 29  -No evidence of ongoing losses, renal function WNL  -Plan to repeat CBC today

## 2020-02-26 NOTE — ASSESSMENT & PLAN NOTE
-Documented hx of OAB with urinary incontinence  -Not currently on any medications for OAB  -Continues to patient denies any urinary symptoms today  -Continue current management via standard UI supplies

## 2020-02-26 NOTE — ASSESSMENT & PLAN NOTE
-Patient stable from previous visit, remains pleasantly confused with stable mood  -AOx1 on exam today with poor recall; no behavioral disturbances  -Denies depression or anxiety symptoms  -Continue donepezil 10 mg QD  -Donepezil well-tolerated, with no bradycardia per review of VS log (HR 60s-70s)  -Continue ongoing monitoring for bradycardia at all visits  -Previous B12 deficiency resolved (B12 142 4/2019, improved to 776 10/31/19)  -Continue B12 replacement via B12 1000 mcg QD  -Consider repeat B12 level in 6 months from previous (May 2020)

## 2020-02-26 NOTE — LETTER
February 26, 2020     A&B    Patient: Anurag Talbert   YOB: 1935   Date of Visit: 2/26/2020       Dear Dr Gonzales Free:    Thank you for referring Anurag Talbert to me for evaluation  Below are my notes for this consultation  If you have questions, please do not hesitate to call me  I look forward to following your patient along with you  Sincerely,        Jazmyn Lopez MD        CC: No Recipients  Jazmyn Lopez MD  2/26/2020  2:25 PM  Attested Addendum                                                             Nursing Home Visit    NAME: Anurag Talbert  AGE: 81 y o  SEX: male [id number]    DATE OF ENCOUNTER: 2/26/2020    Nursing home/assisted living [facility]: [facility]  Patient room number: 223  Code status: Full code   Patient has been legally declared an incapacitated person and a Mary Distel of the [facility] has been appointed Shirley Bryant )      Assessment and Plan     Problem List Items Addressed This Visit        Cardiovascular and Mediastinum    Essential hypertension     -BP 121/93, at goal today (BP goal <150/90)  -Reviewed BP log, has been at goal overall (120s-140s/70s-90s)  -Continue HCTZ 12 5 mg QD, well-tolerated by patient  -Cr 0 86, K 3 5 on 10/31/19 labs  -Previous hypokalemia resolved but remains at lower limit of normal  -Suspect lower-end potassium level likely 2/2 thiazide usage  -Continue potassium supplementation 20 mEq BID  -Recommend BMP Q6 months, due May 2020            Nervous and Auditory    Dementia ([facility] [address] ) - Primary     -Patient stable from previous visit, remains pleasantly confused with stable mood  -AOx1 on exam today with poor recall; no behavioral disturbances  -Denies depression or anxiety symptoms  -Continue donepezil 10 mg QD  -Donepezil well-tolerated, with no bradycardia per review of VS log (HR 60s-70s)  -Continue ongoing monitoring for bradycardia at all visits  -Previous B12 deficiency resolved (B12 142 4/2019, improved to 776 10/31/19)  -Continue B12 replacement via B12 1000 mcg QD  -Consider repeat B12 level in 6 months from previous (May 2020)            Musculoskeletal and Integument    Osteoarthritis     -Asymptomatic of OA of knees  -Continue tylenol 325 mg Q4H PRN for pain (TDD<3g/day)  -Continue voltaren gel topical PRN            Genitourinary    OAB (overactive bladder)     -Documented hx of OAB with urinary incontinence  -Not currently on any medications for OAB  -Continues to patient denies any urinary symptoms today  -Continue current management via standard UI supplies            Other    Iron deficiency anemia     -Normocytic, normochromic anemia in April 2019  -Taking ferrous sulfate 325 mg QD for past 4 months  -Iron supplement well-tolerated, denies constipation  -Labs 10/31/19 showed Hb 14 7, ferritin 29  -No evidence of ongoing losses, renal function WNL  -Plan to repeat CBC, ferritin today         Pre-diabetes     -Last HbA1C 5 9% on 4/30/19  -Glucose 92 on 10/31/19 labs  -Encouraged to limit dietary carb intake   -Consider repeating HbA1C with next labs May 2020         Cervicalgia     -Patient asymptomatic at this time  -Mild hypertonicity on exam but non-tender   -Continue tylenol 325 mg Q4H PRN for pain (TDD<3g/day)  -Continue voltaren gel 4x/day PRN for neck pain         Nasal congestion     -No evidence of nasal congestion on exam today  -Possibly turbinate swelling 2/2 allergic/vasomotor rhinitis  -May be due to dust exposure vs  seasonal allergies  -Start flonase intranasal spray QD                 Chief Complaint     Follow up visit (routine)    History of Present Illness     HPI    Patient seen in his room today, no acute complaints today other than some nasal congestion, which is bothering him slightly  Says nothing is coming out of his nose, it just feels "stuffy"  Denies cough, feels well otherwise  Sleeping and eating well  No problem with BMs, UOP normal per patient  Denies falls  Denies pain   Mood is good  Making friends, says he went to a birthday party yesterday for another resident who turned 9 years old  POA visited patient today as well  The following portions of the patient's history were reviewed and updated as appropriate: allergies, current medications, past family history, past medical history, past social history, past surgical history and problem list     Review of Systems     Review of Systems   Constitutional: Negative for activity change, appetite change, chills and fever  HENT: Positive for congestion  Eyes: Negative for visual disturbance  Respiratory: Negative for chest tightness and shortness of breath  Cardiovascular: Negative for chest pain, palpitations and leg swelling  Gastrointestinal: Negative for abdominal pain, diarrhea, nausea and vomiting  Genitourinary: Negative for dysuria and enuresis  Musculoskeletal: Negative for gait problem  OA pain is stable, denies neck pain today   Skin: Negative for rash  Neurological: Negative for syncope, weakness and light-headedness  Psychiatric/Behavioral: Positive for confusion  Negative for agitation, dysphoric mood, sleep disturbance and suicidal ideas  All other systems reviewed and are negative  Active Problem List     Patient Active Problem List   Diagnosis    Dementia ([facility] [address] )    History of gastroesophageal reflux (GERD)    Left ventricular hypertrophy by electrocardiogram    Iron deficiency anemia    B12 deficiency    Hypokalemia    Essential hypertension    Overgrown toenails    Osteoarthritis    Pre-diabetes    OAB (overactive bladder)    Cervicalgia         Current Medications   Medications reviewed and updated in facility chart        Current Outpatient Medications:     acetaminophen (TYLENOL) 325 mg tablet, Take 2 tablets (650 mg total) by mouth every 6 (six) hours as needed for fever, Disp: 30 tablet, Rfl: 0    cyanocobalamin 1000 MCG tablet, Take 1 tablet (1,000 mcg total) by mouth daily, Disp: 30 tablet, Rfl: 0    diclofenac sodium (VOLTAREN) 1 %, Apply 2 g topically daily at bedtime Apply 2 GM to posterior neck at bedtime, Disp: 100 g, Rfl: 5    donepezil (ARICEPT) 10 mg tablet, Take 10 mg by mouth daily at bedtime, Disp: , Rfl:     hydrochlorothiazide (HYDRODIURIL) 12 5 mg tablet, Take 1 tablet (12 5 mg total) by mouth daily, Disp: 30 tablet, Rfl: 0    potassium chloride (K-DUR,KLOR-CON) 20 mEq tablet, Take 1 tablet (20 mEq total) by mouth 2 (two) times a day, Disp: 60 tablet, Rfl: 0     Objective     /93   Pulse 70   Temp 98 1 °F (36 7 °C)   Ht 5' 8" (1 727 m)   Wt 98 9 kg (218 lb)   BMI 33 15 kg/m²      Physical Exam   Constitutional: He appears well-developed and well-nourished  No distress  HENT:   Head: Normocephalic and atraumatic  No visible nasal discharge   Eyes: Conjunctivae are normal  Right eye exhibits no discharge  Left eye exhibits no discharge  Cardiovascular: Normal rate, regular rhythm and intact distal pulses  Pulmonary/Chest: Effort normal and breath sounds normal  No respiratory distress  Abdominal: Soft  Bowel sounds are normal  There is no tenderness  Musculoskeletal: Normal range of motion  He exhibits no edema  Mild hypertonicity of posterior neck but no tenderness to palpation   Neurological: He is alert  Oriented to self only, unchanged   Skin: Skin is warm and dry  Capillary refill takes less than 2 seconds  No rash noted  Psychiatric: He has a normal mood and affect  His behavior is normal    Pleasantly confused, cooperative, makes frequent jokes   Vitals reviewed  Pertinent Laboratory/Diagnostic Studies: None      Attestation signed by Dominik Duque MD at 2/26/2020  2:33 PM:  I discussed case with the resident and reviewed resident note  I was present at assisted living and supervised the resident  I agree with the resident management as it was presented to me   Will order ferritin and if level above 50 will discontinue ferrous sulfate

## 2020-02-26 NOTE — ASSESSMENT & PLAN NOTE
-Last HbA1C 5 9% on 4/30/19  -Glucose 92 on 10/31/19 labs  -Encouraged to limit dietary carb intake   -Consider repeating HbA1C with next labs May 2020

## 2020-02-26 NOTE — ASSESSMENT & PLAN NOTE
-Asymptomatic of OA of knees  -Continue tylenol 325 mg Q4H PRN for pain (TDD<3g/day)  -Continue voltaren gel topical PRN

## 2020-02-26 NOTE — ASSESSMENT & PLAN NOTE
-QO 736/77, at goal today (BP goal <150/90)  -Reviewed BP log, has been at goal overall (120s-140s/70s-90s)  -Continue HCTZ 12 5 mg QD, well-tolerated by patient  -Cr 0 86, K 3 5 on 10/31/19 labs  -Previous hypokalemia resolved but remains at lower limit of normal  -Suspect lower-end potassium level likely 2/2 thiazide usage  -Continue potassium supplementation 20 mEq QD  -Recommend BMP Q6 months, due May 2020

## 2020-02-26 NOTE — PROGRESS NOTES
Nursing Home Visit    NAME: Phan Chiang  AGE: 81 y o  SEX: male [phone]    DATE OF ENCOUNTER: 2/26/2020    Nursing home/assisted living name: [facility]  Patient room number: 223  Code status: Full code   Patient has been legally declared an incapacitated person and a Geo Darrian of the [employer] [facility] has been appointed Jon Rodriguez )      Assessment and Plan     Problem List Items Addressed This Visit        Cardiovascular and Mediastinum    Essential hypertension     -BP 121/93, at goal today (BP goal <150/90)  -Reviewed BP log, has been at goal overall (120s-140s/70s-90s)  -Continue HCTZ 12 5 mg QD, well-tolerated by patient  -Cr 0 86, K 3 5 on 10/31/19 labs  -Previous hypokalemia resolved but remains at lower limit of normal  -Suspect lower-end potassium level likely 2/2 thiazide usage  -Continue potassium supplementation 20 mEq BID  -Recommend BMP Q6 months, due May 2020            Nervous and Auditory    Dementia ([facility] [address] ) - Primary     -Patient stable from previous visit, remains pleasantly confused with stable mood  -AOx1 on exam today with poor recall; no behavioral disturbances  -Denies depression or anxiety symptoms  -Continue donepezil 10 mg QD  -Donepezil well-tolerated, with no bradycardia per review of VS log (HR 60s-70s)  -Continue ongoing monitoring for bradycardia at all visits  -Previous B12 deficiency resolved (B12 142 4/2019, improved to 776 10/31/19)  -Continue B12 replacement via B12 1000 mcg QD  -Consider repeat B12 level in 6 months from previous (May 2020)            Musculoskeletal and Integument    Osteoarthritis     -Asymptomatic of OA of knees  -Continue tylenol 325 mg Q4H PRN for pain (TDD<3g/day)  -Continue voltaren gel topical PRN            Genitourinary    OAB (overactive bladder)     -Documented hx of OAB with urinary incontinence  -Not currently on any medications for OAB  -Continues to patient denies any urinary symptoms today  -Continue current management via standard UI supplies            Other    Iron deficiency anemia     -Normocytic, normochromic anemia in April 2019  -Taking ferrous sulfate 325 mg QD for past 4 months  -Iron supplement well-tolerated, denies constipation  -Labs 10/31/19 showed Hb 14 7, ferritin 29  -No evidence of ongoing losses, renal function WNL  -Plan to repeat CBC, ferritin today         Pre-diabetes     -Last HbA1C 5 9% on 4/30/19  -Glucose 92 on 10/31/19 labs  -Encouraged to limit dietary carb intake   -Consider repeating HbA1C with next labs May 2020         Cervicalgia     -Patient asymptomatic at this time  -Mild hypertonicity on exam but non-tender   -Continue tylenol 325 mg Q4H PRN for pain (TDD<3g/day)  -Continue voltaren gel 4x/day PRN for neck pain         Nasal congestion     -No evidence of nasal congestion on exam today  -Possibly turbinate swelling 2/2 allergic/vasomotor rhinitis  -May be due to dust exposure vs  seasonal allergies  -Start flonase intranasal spray QD                 Chief Complaint     Follow up visit (routine)    History of Present Illness     HPI    Patient seen in his room today, no acute complaints today other than some nasal congestion, which is bothering him slightly  Says nothing is coming out of his nose, it just feels "stuffy"  Denies cough, feels well otherwise  Sleeping and eating well  No problem with BMs, UOP normal per patient  Denies falls  Denies pain  Mood is good  Making friends, says he went to a birthday party yesterday for another resident who turned 9 years old  POA visited patient today as well      The following portions of the patient's history were reviewed and updated as appropriate: allergies, current medications, past family history, past medical history, past social history, past surgical history and problem list     Review of Systems     Review of Systems   Constitutional: Negative for activity change, appetite change, chills and fever  HENT: Positive for congestion  Eyes: Negative for visual disturbance  Respiratory: Negative for chest tightness and shortness of breath  Cardiovascular: Negative for chest pain, palpitations and leg swelling  Gastrointestinal: Negative for abdominal pain, diarrhea, nausea and vomiting  Genitourinary: Negative for dysuria and enuresis  Musculoskeletal: Negative for gait problem  OA pain is stable, denies neck pain today   Skin: Negative for rash  Neurological: Negative for syncope, weakness and light-headedness  Psychiatric/Behavioral: Positive for confusion  Negative for agitation, dysphoric mood, sleep disturbance and suicidal ideas  All other systems reviewed and are negative  Active Problem List     Patient Active Problem List   Diagnosis    Dementia ([facility] [address] )    History of gastroesophageal reflux (GERD)    Left ventricular hypertrophy by electrocardiogram    Iron deficiency anemia    B12 deficiency    Hypokalemia    Essential hypertension    Overgrown toenails    Osteoarthritis    Pre-diabetes    OAB (overactive bladder)    Cervicalgia         Current Medications   Medications reviewed and updated in facility chart        Current Outpatient Medications:     acetaminophen (TYLENOL) 325 mg tablet, Take 2 tablets (650 mg total) by mouth every 6 (six) hours as needed for fever, Disp: 30 tablet, Rfl: 0    cyanocobalamin 1000 MCG tablet, Take 1 tablet (1,000 mcg total) by mouth daily, Disp: 30 tablet, Rfl: 0    diclofenac sodium (VOLTAREN) 1 %, Apply 2 g topically daily at bedtime Apply 2 GM to posterior neck at bedtime, Disp: 100 g, Rfl: 5    donepezil (ARICEPT) 10 mg tablet, Take 10 mg by mouth daily at bedtime, Disp: , Rfl:     hydrochlorothiazide (HYDRODIURIL) 12 5 mg tablet, Take 1 tablet (12 5 mg total) by mouth daily, Disp: 30 tablet, Rfl: 0    potassium chloride (K-DUR,KLOR-CON) 20 mEq tablet, Take 1 tablet (20 mEq total) by mouth 2 (two) times a day, Disp: 60 tablet, Rfl: 0     Objective     /93   Pulse 70   Temp 98 1 °F (36 7 °C)   Ht 5' 8" (1 727 m)   Wt 98 9 kg (218 lb)   BMI 33 15 kg/m²     Physical Exam   Constitutional: He appears well-developed and well-nourished  No distress  HENT:   Head: Normocephalic and atraumatic  No visible nasal discharge   Eyes: Conjunctivae are normal  Right eye exhibits no discharge  Left eye exhibits no discharge  Cardiovascular: Normal rate, regular rhythm and intact distal pulses  Pulmonary/Chest: Effort normal and breath sounds normal  No respiratory distress  Abdominal: Soft  Bowel sounds are normal  There is no tenderness  Musculoskeletal: Normal range of motion  He exhibits no edema  Mild hypertonicity of posterior neck but no tenderness to palpation   Neurological: He is alert  Oriented to self only, unchanged   Skin: Skin is warm and dry  Capillary refill takes less than 2 seconds  No rash noted  Psychiatric: He has a normal mood and affect  His behavior is normal    Pleasantly confused, cooperative, makes frequent jokes   Vitals reviewed        Pertinent Laboratory/Diagnostic Studies: None

## 2020-03-06 ENCOUNTER — PATIENT OUTREACH (OUTPATIENT)
Dept: FAMILY MEDICINE CLINIC | Facility: CLINIC | Age: 85
End: 2020-03-06

## 2020-03-06 ENCOUNTER — TELEPHONE (OUTPATIENT)
Dept: FAMILY MEDICINE CLINIC | Facility: CLINIC | Age: 85
End: 2020-03-06

## 2020-03-06 NOTE — TELEPHONE ENCOUNTER
Person identifying herself as patients 1st cousin (later found to be Amrita Savage)  Requesting information on patients where about  Caller indicated she called multiple facilities looking for this family member  She also stated, she asked patient sister where he was but would not tell her anything  I also mentioned due to [address] we too, are not able to release patient information  Everton Garcia, has been very persistent after several attempt in an approximant 3 week period  After repeating myself several time that I was not able to even tell her if this was our patient she then asked to speak with   This call was then transferred to our

## 2020-04-29 ENCOUNTER — TELEMEDICINE (OUTPATIENT)
Dept: FAMILY MEDICINE CLINIC | Facility: CLINIC | Age: 85
End: 2020-04-29

## 2020-04-29 VITALS
HEART RATE: 78 BPM | TEMPERATURE: 96.8 F | BODY MASS INDEX: 32.84 KG/M2 | OXYGEN SATURATION: 98 % | SYSTOLIC BLOOD PRESSURE: 110 MMHG | DIASTOLIC BLOOD PRESSURE: 74 MMHG | WEIGHT: 216 LBS

## 2020-04-29 DIAGNOSIS — R73.03 PRE-DIABETES: ICD-10-CM

## 2020-04-29 DIAGNOSIS — I10 ESSENTIAL HYPERTENSION: ICD-10-CM

## 2020-04-29 DIAGNOSIS — M15.9 OSTEOARTHRITIS OF MULTIPLE JOINTS, UNSPECIFIED OSTEOARTHRITIS TYPE: ICD-10-CM

## 2020-04-29 DIAGNOSIS — J30.9 ALLERGIC RHINITIS, UNSPECIFIED SEASONALITY, UNSPECIFIED TRIGGER: ICD-10-CM

## 2020-04-29 DIAGNOSIS — N32.81 OAB (OVERACTIVE BLADDER): ICD-10-CM

## 2020-04-29 DIAGNOSIS — G30.1 LATE ONSET ALZHEIMER'S DISEASE WITHOUT BEHAVIORAL DISTURBANCE (HCC): Primary | ICD-10-CM

## 2020-04-29 DIAGNOSIS — M54.2 CERVICALGIA: ICD-10-CM

## 2020-04-29 DIAGNOSIS — F02.80 LATE ONSET ALZHEIMER'S DISEASE WITHOUT BEHAVIORAL DISTURBANCE (HCC): Primary | ICD-10-CM

## 2020-04-29 DIAGNOSIS — D50.9 IRON DEFICIENCY ANEMIA, UNSPECIFIED IRON DEFICIENCY ANEMIA TYPE: ICD-10-CM

## 2020-04-29 PROBLEM — E87.6 HYPOKALEMIA: Status: RESOLVED | Noted: 2019-06-04 | Resolved: 2020-04-29

## 2020-04-29 PROBLEM — Z87.19 HISTORY OF GASTROESOPHAGEAL REFLUX (GERD): Status: RESOLVED | Noted: 2019-04-29 | Resolved: 2020-04-29

## 2020-04-29 PROBLEM — L60.2 OVERGROWN TOENAILS: Status: RESOLVED | Noted: 2019-10-30 | Resolved: 2020-04-29

## 2020-04-29 PROBLEM — E53.8 B12 DEFICIENCY: Status: RESOLVED | Noted: 2019-05-07 | Resolved: 2020-04-29

## 2020-04-29 PROCEDURE — 99213 OFFICE O/P EST LOW 20 MIN: CPT | Performed by: FAMILY MEDICINE

## 2020-06-24 ENCOUNTER — NURSING HOME VISIT (OUTPATIENT)
Dept: FAMILY MEDICINE CLINIC | Facility: CLINIC | Age: 85
End: 2020-06-24

## 2020-06-24 VITALS
WEIGHT: 210 LBS | DIASTOLIC BLOOD PRESSURE: 80 MMHG | TEMPERATURE: 98 F | BODY MASS INDEX: 31.93 KG/M2 | HEART RATE: 73 BPM | SYSTOLIC BLOOD PRESSURE: 130 MMHG

## 2020-06-24 DIAGNOSIS — G30.1 LATE ONSET ALZHEIMER'S DISEASE WITHOUT BEHAVIORAL DISTURBANCE (HCC): ICD-10-CM

## 2020-06-24 DIAGNOSIS — I10 ESSENTIAL HYPERTENSION: Primary | ICD-10-CM

## 2020-06-24 DIAGNOSIS — F02.80 LATE ONSET ALZHEIMER'S DISEASE WITHOUT BEHAVIORAL DISTURBANCE (HCC): ICD-10-CM

## 2020-06-24 PROCEDURE — 99334 PR DOM/R-HOME E/M EST PT SELF-LMTD/MINOR 15 MINUTES: CPT | Performed by: FAMILY MEDICINE

## 2020-06-25 DIAGNOSIS — J30.9 ALLERGIC RHINITIS, UNSPECIFIED SEASONALITY, UNSPECIFIED TRIGGER: Primary | ICD-10-CM

## 2020-06-25 RX ORDER — FLUTICASONE PROPIONATE 50 MCG
1 SPRAY, SUSPENSION (ML) NASAL DAILY PRN
Qty: 16 G | Refills: 2 | Status: SHIPPED | OUTPATIENT
Start: 2020-06-25

## 2020-08-12 ENCOUNTER — NURSING HOME VISIT (OUTPATIENT)
Dept: FAMILY MEDICINE CLINIC | Facility: CLINIC | Age: 85
End: 2020-08-12

## 2020-08-12 VITALS
HEART RATE: 69 BPM | DIASTOLIC BLOOD PRESSURE: 72 MMHG | TEMPERATURE: 97.5 F | WEIGHT: 215 LBS | BODY MASS INDEX: 32.69 KG/M2 | SYSTOLIC BLOOD PRESSURE: 136 MMHG

## 2020-08-12 DIAGNOSIS — G30.1 LATE ONSET ALZHEIMER'S DISEASE WITHOUT BEHAVIORAL DISTURBANCE (HCC): ICD-10-CM

## 2020-08-12 DIAGNOSIS — F02.80 LATE ONSET ALZHEIMER'S DISEASE WITHOUT BEHAVIORAL DISTURBANCE (HCC): ICD-10-CM

## 2020-08-12 DIAGNOSIS — I10 ESSENTIAL HYPERTENSION: Primary | ICD-10-CM

## 2020-08-12 DIAGNOSIS — M54.2 CERVICALGIA: ICD-10-CM

## 2020-08-12 DIAGNOSIS — R73.03 PRE-DIABETES: ICD-10-CM

## 2020-08-12 DIAGNOSIS — N32.81 OAB (OVERACTIVE BLADDER): ICD-10-CM

## 2020-08-12 DIAGNOSIS — J30.9 ALLERGIC RHINITIS, UNSPECIFIED SEASONALITY, UNSPECIFIED TRIGGER: ICD-10-CM

## 2020-08-12 DIAGNOSIS — M15.9 OSTEOARTHRITIS OF MULTIPLE JOINTS, UNSPECIFIED OSTEOARTHRITIS TYPE: ICD-10-CM

## 2020-08-12 PROCEDURE — 99336 PR DOM/R-HOME E/M EST PT MOD HI SEVERITY 40 MINUTES: CPT | Performed by: FAMILY MEDICINE

## 2020-08-12 NOTE — ASSESSMENT & PLAN NOTE
- Asymptomatic OA of knees  - Continue Tylenol 325 mg Q4H PRN for pain (TDD<3g/day)  - Continue Voltaren gel qHS

## 2020-08-12 NOTE — ASSESSMENT & PLAN NOTE
- Patient reports chronic mild neck/shoulder discomfort, chronic problem for patient  - Mild hypertonicity on exam but non-tender, FROM in B/L UE   - Continue Tylenol 325 mg Q4H PRN for pain (TDD<3g/day)  - Continue Voltaren gel qHS

## 2020-08-12 NOTE — ASSESSMENT & PLAN NOTE
- BP today 136/72, at goal (<150/90  - Continue HCTZ 12 5 mg QD, well-tolerated by patient  - Cr 0 79, K 3 7 on 6/8/20 labs  - Suspect lower-end potassium level likely 2/2 thiazide   - Continue potassium supplementation 20 mEq BID

## 2020-08-12 NOTE — ASSESSMENT & PLAN NOTE
- Documented hx of OAB with urinary incontinence  - Not currently on any medications for OAB  - Patient denies any urinary symptoms today  - Continue current management via standard UI supplies

## 2020-08-12 NOTE — LETTER
August 12, 2020     [facility]    Patient: Alejandra Walker   YOB: 1935   Date of Visit: 8/12/2020       Dear Dr Russ Awad:    Thank you for referring Alejandra Walker to me for evaluation  Below are my notes for this consultation  If you have questions, please do not hesitate to call me  I look forward to following your patient along with you  Sincerely,        China Richardson DO        CC: No Recipients  China Henningert, [address]  8/12/2020  2:53 PM  Attested Addendum  Nursing Home Visit    NAME: Alejandra Walker  AGE: 81 y o  SEX: male [id number]    DATE OF ENCOUNTER: 8/12/2020    Nursing home/assisted living name: [facility]  Patient room number: 155  Code status: Level 1 Full Code      Assessment and Plan     Problem List Items Addressed This Visit        Respiratory    Allergic rhinitis     -Continue Flonase intranasal spray QD            Cardiovascular and Mediastinum    Essential hypertension - Primary     - BP today 136/72, at goal (<150/90  - Continue HCTZ 12 5 mg QD, well-tolerated by patient  - Cr 0 79, K 3 7 on 6/8/20 labs  - Suspect lower-end potassium level likely 2/2 thiazide   - Continue potassium supplementation 20 mEq BID                Nervous and Auditory    Dementia (HCC)     - Patient stable from previous visit, remains pleasantly confused with stable mood, oriented to self only  - No behavioral disturbances reported  - Continue Donepezil 10 mg QD  - Donepezil well-tolerated, with no bradycardia per review of VS log (HR 60s-70s)  - Continue ongoing monitoring for bradycardia at all visits  - Last B12 level 776 (10/31/19)  - Continue B12 replacement via B12 1000 mcg QD  - Patient was losing weight due to skipped meals, weight 215 today - gained 5 lbs since 6/24, enjoys Ensure BID, will continue              Musculoskeletal and Integument    Osteoarthritis     - Asymptomatic OA of knees  - Continue Tylenol 325 mg Q4H PRN for pain (TDD<3g/day)  - Continue Voltaren gel qHS Genitourinary    OAB (overactive bladder)     - Documented hx of OAB with urinary incontinence  - Not currently on any medications for OAB  - Patient denies any urinary symptoms today  - Continue current management via standard UI supplies            Other    Pre-diabetes     - Last A1C 5 9% on 4/30/19, repeat ordered in June however not completed, no need to check at this time as glucose readings are always WNL  - Glucose 79 on 6/8/20 labs  - Encouraged to limit dietary carb intake         Cervicalgia     - Patient reports chronic mild neck/shoulder discomfort, chronic problem for patient  - Mild hypertonicity on exam but non-tender, FROM in B/L UE   - Continue Tylenol 325 mg Q4H PRN for pain (TDD<3g/day)  - Continue Voltaren gel qHS                 Chief Complaint     None    History of Present Illness     Patient seen for health maintenance visit at [facility] nursing [facility] today  Patient was last seen by Dr Douglas Sequeira on 6/24/20  Since then, COVID screen negative and no acute concerns  Patient seen and examined today with no acute complaints  He is pleasantly demented and oriented to self only  States he does not like to go outside because of "that white stuff on the ground " Patient denies cough, chest pain, shortness of breath  Admits mild tolerable neck pain  Staff reports no behavioral issues or concerns         The following portions of the patient's history were reviewed and updated as appropriate: allergies, current medications, past family history, past medical history, past social history, past surgical history and problem list     Review of Systems     Review of Systems   Unable to perform ROS: Dementia       Active Problem List     Patient Active Problem List   Diagnosis    Dementia ([facility] [address] )    Left ventricular hypertrophy by electrocardiogram    Essential hypertension    Osteoarthritis    Pre-diabetes    OAB (overactive bladder)    Cervicalgia    Allergic rhinitis         Current Medications Medications reviewed and updated in facility chart  Current Outpatient Medications:     Nutritional Supplements (ENSURE PO), Take by mouth 2 (two) times a day, Disp: , Rfl:     acetaminophen (TYLENOL) 325 mg tablet, Take 2 tablets (650 mg total) by mouth every 6 (six) hours as needed for fever, Disp: 30 tablet, Rfl: 0    cyanocobalamin 1000 MCG tablet, Take 1 tablet (1,000 mcg total) by mouth daily, Disp: 30 tablet, Rfl: 0    diclofenac sodium (VOLTAREN) 1 %, Apply 2 g topically daily at bedtime Apply 2 GM to posterior neck at bedtime, Disp: 100 g, Rfl: 5    donepezil (ARICEPT) 10 mg tablet, Take 10 mg by mouth daily at bedtime, Disp: , Rfl:     fluticasone (FLONASE) 50 mcg/act nasal spray, 1 spray into each nostril daily as needed for rhinitis (Nasal Congestion), Disp: 16 g, Rfl: 2    hydrochlorothiazide (HYDRODIURIL) 12 5 mg tablet, Take 1 tablet (12 5 mg total) by mouth daily, Disp: 30 tablet, Rfl: 0    potassium chloride (K-DUR,KLOR-CON) 20 mEq tablet, Take 1 tablet (20 mEq total) by mouth 2 (two) times a day, Disp: 60 tablet, Rfl: 0     Objective     /72   Pulse 69   Temp 97 5 °F (36 4 °C)   Wt 97 5 kg (215 lb)   BMI 32 69 kg/m²     Physical Exam  Vitals signs reviewed  Constitutional:       General: He is not in acute distress  Appearance: Normal appearance  He is not ill-appearing  HENT:      Head: Normocephalic and atraumatic  Right Ear: External ear normal       Left Ear: External ear normal       Nose: Nose normal       Mouth/Throat:      Mouth: Mucous membranes are moist       Pharynx: Oropharynx is clear  Eyes:      Extraocular Movements: Extraocular movements intact  Conjunctiva/sclera: Conjunctivae normal    Neck:      Musculoskeletal: Normal range of motion  Muscular tenderness present  Cardiovascular:      Rate and Rhythm: Normal rate and regular rhythm  Pulses: Normal pulses  Heart sounds: Normal heart sounds     Pulmonary:      Effort: Pulmonary effort is normal       Breath sounds: Normal breath sounds  Abdominal:      General: Bowel sounds are normal  There is no distension  Palpations: Abdomen is soft  Tenderness: There is no abdominal tenderness  Musculoskeletal: Normal range of motion  General: No swelling  Skin:     General: Skin is warm and dry  Capillary Refill: Capillary refill takes less than 2 seconds  Neurological:      General: No focal deficit present  Mental Status: He is alert  Comments: Oriented to self only   Psychiatric:      Comments: Pleasantly demented, confabulates         Pertinent Laboratory/Diagnostic Studies:    COVID screening 8/5/20: negative                  Attestation signed by Na Orozco MD at 8/12/2020  3:01 PM:  I discussed case with the resident and reviewed resident note  I was present at assisted living and supervised the resident  I agree with the resident management as it was presented to me  Wt increase with Ensure, continue Wt check monthly and will repeat K at next visit

## 2020-08-12 NOTE — ASSESSMENT & PLAN NOTE
- Last A1C 5 9% on 4/30/19, repeat ordered in June however not completed, no need to check at this time as glucose readings are always WNL  - Glucose 79 on 6/8/20 labs  - Encouraged to limit dietary carb intake

## 2020-08-12 NOTE — ASSESSMENT & PLAN NOTE
- Patient stable from previous visit, remains pleasantly confused with stable mood, oriented to self only  - No behavioral disturbances reported  - Continue Donepezil 10 mg QD  - Donepezil well-tolerated, with no bradycardia per review of VS log (HR 60s-70s)  - Continue ongoing monitoring for bradycardia at all visits  - Last B12 level 776 (10/31/19)  - Continue B12 replacement via B12 1000 mcg QD  - Patient was losing weight due to skipped meals, weight 215 today - gained 5 lbs since 6/24, enjoys Ensure BID, will continue

## 2020-08-12 NOTE — PROGRESS NOTES
Nursing Home Visit    NAME: Cindy Blanton  AGE: 81 y o  SEX: male [id number]    DATE OF ENCOUNTER: 8/12/2020    [facility]ursing home[facility] living [facility]  Patient room number: 263  Code status: Level 1 Full Code      Assessment and Plan     Problem List Items Addressed This Visit        Respiratory    Allergic rhinitis     -Continue Flonase intranasal spray QD            Cardiovascular and Mediastinum    Essential hypertension - Primary     - BP today 136/72, at goal (<150/90  - Continue HCTZ 12 5 mg QD, well-tolerated by patient  - Cr 0 79, K 3 7 on 6/8/20 labs  - Suspect lower-end potassium level likely 2/2 thiazide   - Continue potassium supplementation 20 mEq BID                Nervous and Auditory    Dementia (HCC)     - Patient stable from previous visit, remains pleasantly confused with stable mood, oriented to self only  - No behavioral disturbances reported  - Continue Donepezil 10 mg QD  - Donepezil well-tolerated, with no bradycardia per review of VS log (HR 60s-70s)  - Continue ongoing monitoring for bradycardia at all visits  - Last B12 level 776 (10/31/19)  - Continue B12 replacement via B12 1000 mcg QD  - Patient was losing weight due to skipped meals, weight 215 today - gained 5 lbs since 6/24, enjoys Ensure BID, will continue              Musculoskeletal and Integument    Osteoarthritis     - Asymptomatic OA of knees  - Continue Tylenol 325 mg Q4H PRN for pain (TDD<3g/day)  - Continue Voltaren gel qHS            Genitourinary    OAB (overactive bladder)     - Documented hx of OAB with urinary incontinence  - Not currently on any medications for OAB  - Patient denies any urinary symptoms today  - Continue current management via standard UI supplies            Other    Pre-diabetes     - Last A1C 5 9% on 4/30/19, repeat ordered in June however not completed, no need to check at this time as glucose readings are always WNL  - Glucose 79 on 6/8/20 labs  - Encouraged to limit dietary carb intake         Cervicalgia     - Patient reports chronic mild neck/shoulder discomfort, chronic problem for patient  - Mild hypertonicity on exam but non-tender, FROM in B/L UE   - Continue Tylenol 325 mg Q4H PRN for pain (TDD<3g/day)  - Continue Voltaren gel qHS                 Chief Complaint     None    History of Present Illness     Patient seen for health maintenance visit at [facility] nursing [facility] today  Patient was last seen by Dr Andrade Cartagena on 6/24/20  Since then, COVID screen negative and no acute concerns  Patient seen and examined today with no acute complaints  He is pleasantly demented and oriented to self only  States he does not like to go outside because of "that white stuff on the ground " Patient denies cough, chest pain, shortness of breath  Admits mild tolerable neck pain  Staff reports no behavioral issues or concerns  The following portions of the patient's history were reviewed and updated as appropriate: allergies, current medications, past family history, past medical history, past social history, past surgical history and problem list     Review of Systems     Review of Systems   Unable to perform ROS: Dementia       Active Problem List     Patient Active Problem List   Diagnosis    Dementia ([facility] [address] )    Left ventricular hypertrophy by electrocardiogram    Essential hypertension    Osteoarthritis    Pre-diabetes    OAB (overactive bladder)    Cervicalgia    Allergic rhinitis         Current Medications   Medications reviewed and updated in facility chart        Current Outpatient Medications:     Nutritional Supplements (ENSURE PO), Take by mouth 2 (two) times a day, Disp: , Rfl:     acetaminophen (TYLENOL) 325 mg tablet, Take 2 tablets (650 mg total) by mouth every 6 (six) hours as needed for fever, Disp: 30 tablet, Rfl: 0    cyanocobalamin 1000 MCG tablet, Take 1 tablet (1,000 mcg total) by mouth daily, Disp: 30 tablet, Rfl: 0    diclofenac sodium (VOLTAREN) 1 %, Apply 2 g topically daily at bedtime Apply 2 GM to posterior neck at bedtime, Disp: 100 g, Rfl: 5    donepezil (ARICEPT) 10 mg tablet, Take 10 mg by mouth daily at bedtime, Disp: , Rfl:     fluticasone (FLONASE) 50 mcg/act nasal spray, 1 spray into each nostril daily as needed for rhinitis (Nasal Congestion), Disp: 16 g, Rfl: 2    hydrochlorothiazide (HYDRODIURIL) 12 5 mg tablet, Take 1 tablet (12 5 mg total) by mouth daily, Disp: 30 tablet, Rfl: 0    potassium chloride (K-DUR,KLOR-CON) 20 mEq tablet, Take 1 tablet (20 mEq total) by mouth 2 (two) times a day, Disp: 60 tablet, Rfl: 0     Objective     /72   Pulse 69   Temp 97 5 °F (36 4 °C)   Wt 97 5 kg (215 lb)   BMI 32 69 kg/m²     Physical Exam  Vitals signs reviewed  Constitutional:       General: He is not in acute distress  Appearance: Normal appearance  He is not ill-appearing  HENT:      Head: Normocephalic and atraumatic  Right Ear: External ear normal       Left Ear: External ear normal       Nose: Nose normal       Mouth/Throat:      Mouth: Mucous membranes are moist       Pharynx: Oropharynx is clear  Eyes:      Extraocular Movements: Extraocular movements intact  Conjunctiva/sclera: Conjunctivae normal    Neck:      Musculoskeletal: Normal range of motion  Muscular tenderness present  Cardiovascular:      Rate and Rhythm: Normal rate and regular rhythm  Pulses: Normal pulses  Heart sounds: Normal heart sounds  Pulmonary:      Effort: Pulmonary effort is normal       Breath sounds: Normal breath sounds  Abdominal:      General: Bowel sounds are normal  There is no distension  Palpations: Abdomen is soft  Tenderness: There is no abdominal tenderness  Musculoskeletal: Normal range of motion  General: No swelling  Skin:     General: Skin is warm and dry  Capillary Refill: Capillary refill takes less than 2 seconds     Neurological:      General: No focal deficit present  Mental Status: He is alert        Comments: Oriented to self only   Psychiatric:      Comments: Pleasantly demented, confabulates         Pertinent Laboratory/Diagnostic Studies:    COVID screening 8/5/20: negative

## 2020-09-30 DIAGNOSIS — Z23 ENCOUNTER FOR IMMUNIZATION: Primary | ICD-10-CM

## 2020-09-30 PROCEDURE — 90662 IIV NO PRSV INCREASED AG IM: CPT | Performed by: FAMILY MEDICINE

## 2020-09-30 PROCEDURE — G0008 ADMIN INFLUENZA VIRUS VAC: HCPCS | Performed by: FAMILY MEDICINE

## 2020-10-05 DIAGNOSIS — M54.2 CERVICALGIA: ICD-10-CM

## 2020-10-06 DIAGNOSIS — M54.2 CERVICALGIA: ICD-10-CM

## 2020-10-14 ENCOUNTER — NURSING HOME VISIT (OUTPATIENT)
Dept: FAMILY MEDICINE CLINIC | Facility: CLINIC | Age: 85
End: 2020-10-14

## 2020-10-14 VITALS
SYSTOLIC BLOOD PRESSURE: 149 MMHG | WEIGHT: 216 LBS | HEART RATE: 70 BPM | TEMPERATURE: 96.4 F | DIASTOLIC BLOOD PRESSURE: 74 MMHG | BODY MASS INDEX: 32.84 KG/M2

## 2020-10-14 DIAGNOSIS — F02.80 LATE ONSET ALZHEIMER'S DISEASE WITHOUT BEHAVIORAL DISTURBANCE (HCC): ICD-10-CM

## 2020-10-14 DIAGNOSIS — N32.81 OAB (OVERACTIVE BLADDER): ICD-10-CM

## 2020-10-14 DIAGNOSIS — G30.1 LATE ONSET ALZHEIMER'S DISEASE WITHOUT BEHAVIORAL DISTURBANCE (HCC): ICD-10-CM

## 2020-10-14 DIAGNOSIS — M15.9 OSTEOARTHRITIS OF MULTIPLE JOINTS, UNSPECIFIED OSTEOARTHRITIS TYPE: ICD-10-CM

## 2020-10-14 DIAGNOSIS — I10 ESSENTIAL HYPERTENSION: ICD-10-CM

## 2020-10-14 DIAGNOSIS — J30.9 ALLERGIC RHINITIS, UNSPECIFIED SEASONALITY, UNSPECIFIED TRIGGER: Primary | ICD-10-CM

## 2020-10-14 DIAGNOSIS — M54.2 CERVICALGIA: ICD-10-CM

## 2020-10-14 PROCEDURE — 99336 PR DOM/R-HOME E/M EST PT MOD HI SEVERITY 40 MINUTES: CPT | Performed by: FAMILY MEDICINE

## 2020-10-28 ENCOUNTER — TELEPHONE (OUTPATIENT)
Dept: CCU | Facility: HOSPITAL | Age: 85
End: 2020-10-28

## 2020-12-09 ENCOUNTER — NURSING HOME VISIT (OUTPATIENT)
Dept: FAMILY MEDICINE CLINIC | Facility: CLINIC | Age: 85
End: 2020-12-09

## 2020-12-09 VITALS
WEIGHT: 234.8 LBS | TEMPERATURE: 96.4 F | BODY MASS INDEX: 35.7 KG/M2 | DIASTOLIC BLOOD PRESSURE: 71 MMHG | HEART RATE: 79 BPM | SYSTOLIC BLOOD PRESSURE: 146 MMHG

## 2020-12-09 DIAGNOSIS — F02.80 LATE ONSET ALZHEIMER'S DISEASE WITHOUT BEHAVIORAL DISTURBANCE (HCC): Primary | ICD-10-CM

## 2020-12-09 DIAGNOSIS — M15.9 OSTEOARTHRITIS OF MULTIPLE JOINTS, UNSPECIFIED OSTEOARTHRITIS TYPE: ICD-10-CM

## 2020-12-09 DIAGNOSIS — N32.81 OAB (OVERACTIVE BLADDER): ICD-10-CM

## 2020-12-09 DIAGNOSIS — R73.03 PRE-DIABETES: ICD-10-CM

## 2020-12-09 DIAGNOSIS — G30.1 LATE ONSET ALZHEIMER'S DISEASE WITHOUT BEHAVIORAL DISTURBANCE (HCC): Primary | ICD-10-CM

## 2020-12-09 DIAGNOSIS — R63.5 WEIGHT GAIN: ICD-10-CM

## 2020-12-09 DIAGNOSIS — J30.9 ALLERGIC RHINITIS, UNSPECIFIED SEASONALITY, UNSPECIFIED TRIGGER: ICD-10-CM

## 2020-12-09 DIAGNOSIS — I10 ESSENTIAL HYPERTENSION: ICD-10-CM

## 2020-12-09 DIAGNOSIS — M54.2 CERVICALGIA: ICD-10-CM

## 2020-12-09 PROCEDURE — 99336 PR DOM/R-HOME E/M EST PT MOD HI SEVERITY 40 MINUTES: CPT | Performed by: FAMILY MEDICINE

## 2021-02-24 ENCOUNTER — NURSING HOME VISIT (OUTPATIENT)
Dept: FAMILY MEDICINE CLINIC | Facility: CLINIC | Age: 86
End: 2021-02-24

## 2021-02-24 VITALS
WEIGHT: 217 LBS | SYSTOLIC BLOOD PRESSURE: 175 MMHG | TEMPERATURE: 96.4 F | DIASTOLIC BLOOD PRESSURE: 73 MMHG | BODY MASS INDEX: 32.99 KG/M2 | HEART RATE: 66 BPM

## 2021-02-24 DIAGNOSIS — N32.81 OAB (OVERACTIVE BLADDER): ICD-10-CM

## 2021-02-24 DIAGNOSIS — R73.03 PRE-DIABETES: ICD-10-CM

## 2021-02-24 DIAGNOSIS — J30.9 ALLERGIC RHINITIS, UNSPECIFIED SEASONALITY, UNSPECIFIED TRIGGER: Primary | ICD-10-CM

## 2021-02-24 DIAGNOSIS — G30.1 LATE ONSET ALZHEIMER'S DISEASE WITHOUT BEHAVIORAL DISTURBANCE (HCC): ICD-10-CM

## 2021-02-24 DIAGNOSIS — I10 ESSENTIAL HYPERTENSION: ICD-10-CM

## 2021-02-24 DIAGNOSIS — F02.80 LATE ONSET ALZHEIMER'S DISEASE WITHOUT BEHAVIORAL DISTURBANCE (HCC): ICD-10-CM

## 2021-02-24 DIAGNOSIS — M15.9 OSTEOARTHRITIS OF MULTIPLE JOINTS, UNSPECIFIED OSTEOARTHRITIS TYPE: ICD-10-CM

## 2021-02-24 DIAGNOSIS — R68.89 FLUCTUATION OF WEIGHT: ICD-10-CM

## 2021-02-24 DIAGNOSIS — M54.2 CERVICALGIA: ICD-10-CM

## 2021-02-24 PROCEDURE — 99336 PR DOM/R-HOME E/M EST PT MOD HI SEVERITY 40 MINUTES: CPT | Performed by: FAMILY MEDICINE

## 2021-02-24 NOTE — PROGRESS NOTES
Nursing Home Visit    NAME: Shari Ladd  AGE: 81 y o   SEX: male [phone]    DATE OF ENCOUNTER: 2/24/2021    Nursing home/assisted living name: [facility]  Patient room number: 213  Code status: Level 1 Full Code      Assessment and Plan     Fluctuation of weight  - Weight today 217 lbs, last visit two months ago was 234 lbs which was a significant increase from his prior weight    - No reported dietary or activity changes  - Monitor weight monthly  - Encourage healthy diet and activity as tolerated    Pre-diabetes  - Last A1C 5 9% on 4/30/19, repeat ordered in June 2020 however not completed, no need to check at this time as glucose readings have been WNL and most recent BMP with glucose 88  - Encouraged to continue healthy diet including Ensure supplement    Cervicalgia  - Patient reports chronic mild neck/shoulder discomfort  - Mild hypertonicity on exam but non-tender, full ROM in B/L UE   - Continue Tylenol 325 mg Q4H PRN for pain (TDD<3g/day)  - Continue Voltaren gel qHS    OAB (overactive bladder)  - Documented hx of OAB with urinary incontinence  - Not currently on any medications for OAB  - Patient denies any urinary symptoms today  - Continue current management via standard UI supplies    Osteoarthritis  - Asymptomatic OA of knees  - Continue Tylenol 325 mg Q4H PRN for pain (TDD<3g/day)  - Continue Voltaren gel qHS    Dementia  - Patient stable from previous visit, remains pleasantly confused with stable mood, oriented to self only  - No behavioral disturbances reported  - Continue Donepezil 10 mg QD  - Donepezil well-tolerated, with no bradycardia  - Continue ongoing monitoring for bradycardia at all visits  - Last B12 level 776 (10/31/19)  - Continue B12 replacement via B12 1000 mcg QD    Essential hypertension  - BP today 175/73, above goal (<150/90) - chart indicates this is an outlier as most BP readings over the last month are at goal  - Continue HCTZ 12 5 mg QD, well-tolerated by patient  - BMP on 10/22 revealed Cr 0 86, K 3 7, stable on potassium supplementation 20 mEq BID so will continue        Allergic rhinitis  -Continue Flonase intranasal spray QD      No orders of the defined types were placed in this encounter  Chief Complaint     No chief complaint on file  History of Present Illness     HPI   Patient presents for health maintenance exam with no acute complaints  He was last seen on 12/9/21 by Dr Marquis Hernández and Dr Charmaine Mccullough  Since then, he received two doses of [address] vaccine on 1/23 and 2/13  The following portions of the patient's history were reviewed and updated as appropriate: allergies, current medications, past family history, past medical history, past social history, past surgical history and problem list     Review of Systems     Review of Systems   Unable to perform ROS: Dementia       Active Problem List     Patient Active Problem List   Diagnosis    Dementia ([facility] [address] )    Left ventricular hypertrophy by electrocardiogram    Essential hypertension    Osteoarthritis    Pre-diabetes    OAB (overactive bladder)    Cervicalgia    Allergic rhinitis    Fluctuation of weight         Current Medications   Medications reviewed and updated in facility chart        Current Outpatient Medications:     acetaminophen (TYLENOL) 325 mg tablet, Take 2 tablets (650 mg total) by mouth every 6 (six) hours as needed for fever, Disp: 30 tablet, Rfl: 0    cyanocobalamin 1000 MCG tablet, Take 1 tablet (1,000 mcg total) by mouth daily, Disp: 30 tablet, Rfl: 0    diclofenac sodium (VOLTAREN) 1 %, APPLY 2GM POSTERIOR NECK AT BEDTIME, Disp: 100 g, Rfl: 10    donepezil (ARICEPT) 10 mg tablet, Take 10 mg by mouth daily at bedtime, Disp: , Rfl:     fluticasone (FLONASE) 50 mcg/act nasal spray, 1 spray into each nostril daily as needed for rhinitis (Nasal Congestion), Disp: 16 g, Rfl: 2    hydrochlorothiazide (HYDRODIURIL) 12 5 mg tablet, Take 1 tablet (12 5 mg total) by mouth daily, Disp: 30 tablet, Rfl: 0    Nutritional Supplements (ENSURE PO), Take by mouth 2 (two) times a day, Disp: , Rfl:     potassium chloride (K-DUR,KLOR-CON) 20 mEq tablet, Take 1 tablet (20 mEq total) by mouth 2 (two) times a day, Disp: 60 tablet, Rfl: 0     Objective     BP (!) 175/73   Pulse 66   Temp (!) 96 4 °F (35 8 °C)   Wt 98 4 kg (217 lb)   BMI 32 99 kg/m²     Physical Exam  Vitals signs reviewed  Constitutional:       Appearance: Normal appearance  HENT:      Head: Normocephalic and atraumatic  Right Ear: External ear normal       Left Ear: External ear normal       Nose: Nose normal       Mouth/Throat:      Mouth: Mucous membranes are dry  Eyes:      Extraocular Movements: Extraocular movements intact  Conjunctiva/sclera: Conjunctivae normal    Neck:      Musculoskeletal: Normal range of motion  Comments: Mild hypertonicity bilateral cervical paraspinals, nontender to palpation  Cardiovascular:      Rate and Rhythm: Normal rate and regular rhythm  Pulses: Normal pulses  Heart sounds: Normal heart sounds  Pulmonary:      Effort: Pulmonary effort is normal       Breath sounds: Normal breath sounds  Abdominal:      General: Abdomen is flat  Bowel sounds are normal       Palpations: Abdomen is soft  Musculoskeletal:      Right lower leg: No edema  Left lower leg: No edema  Skin:     General: Skin is warm and dry  Capillary Refill: Capillary refill takes less than 2 seconds  Neurological:      Mental Status: He is alert  Mental status is at baseline        Comments: Pleasant, oriented to self only, cooperative   Psychiatric:         Mood and Affect: Mood normal          Behavior: Behavior normal          Pertinent Laboratory/Diagnostic Studies:  No new labs

## 2021-02-24 NOTE — ASSESSMENT & PLAN NOTE
- Patient reports chronic mild neck/shoulder discomfort  - Mild hypertonicity on exam but non-tender, full ROM in B/L UE   - Continue Tylenol 325 mg Q4H PRN for pain (TDD<3g/day)  - Continue Voltaren gel qHS

## 2021-02-24 NOTE — ASSESSMENT & PLAN NOTE
- Last A1C 5 9% on 4/30/19, repeat ordered in June 2020 however not completed, no need to check at this time as glucose readings have been WNL and most recent BMP with glucose 88  - Encouraged to continue healthy diet including Ensure supplement

## 2021-02-24 NOTE — ASSESSMENT & PLAN NOTE
- BP today 175/73, above goal (<150/90) - chart indicates this is an outlier as most BP readings over the last month are at goal  - Continue HCTZ 12 5 mg QD, well-tolerated by patient  - BMP on 10/22 revealed Cr 0 86, K 3 7, stable on potassium supplementation 20 mEq BID so will continue

## 2021-02-24 NOTE — ASSESSMENT & PLAN NOTE
- Patient stable from previous visit, remains pleasantly confused with stable mood, oriented to self only  - No behavioral disturbances reported  - Continue Donepezil 10 mg QD  - Donepezil well-tolerated, with no bradycardia  - Continue ongoing monitoring for bradycardia at all visits  - Last B12 level 776 (10/31/19)  - Continue B12 replacement via B12 1000 mcg QD

## 2021-02-24 NOTE — LETTER
February 24, 2021     [facility]    Patient: Anurag Talbert   YOB: 1935   Date of Visit: 2/24/2021       Dear Dr Terra Roque: Thank you for referring Anurag Talbert to me for evaluation  Below are my notes for this consultation  If you have questions, please do not hesitate to call me  I look forward to following your patient along with you  Sincerely,        Patricia Wayne DO        CC: No Recipients  [facility]  2/24/2021  1:41 PM  Attested  Nursing Home Visit    NAME: Anurag Talbert  AGE: 81 y o   SEX: male [id number]    DATE OF ENCOUNTER: 2/24/2021    Nursing home/assisted living name: [facility]  Patient room number: 168  Code status: Level 1 Full Code      Assessment and Plan     Fluctuation of weight  - Weight today 217 lbs, last visit two months ago was 234 lbs which was a significant increase from his prior weight    - No reported dietary or activity changes  - Monitor weight monthly  - Encourage healthy diet and activity as tolerated    Pre-diabetes  - Last A1C 5 9% on 4/30/19, repeat ordered in June 2020 however not completed, no need to check at this time as glucose readings have been WNL and most recent BMP with glucose 88  - Encouraged to continue healthy diet including Ensure supplement    Cervicalgia  - Patient reports chronic mild neck/shoulder discomfort  - Mild hypertonicity on exam but non-tender, full ROM in B/L UE   - Continue Tylenol 325 mg Q4H PRN for pain (TDD<3g/day)  - Continue Voltaren gel qHS    OAB (overactive bladder)  - Documented hx of OAB with urinary incontinence  - Not currently on any medications for OAB  - Patient denies any urinary symptoms today  - Continue current management via standard UI supplies    Osteoarthritis  - Asymptomatic OA of knees  - Continue Tylenol 325 mg Q4H PRN for pain (TDD<3g/day)  - Continue Voltaren gel qHS    Dementia  - Patient stable from previous visit, remains pleasantly confused with stable mood, oriented to self only  - No behavioral disturbances reported  - Continue Donepezil 10 mg QD  - Donepezil well-tolerated, with no bradycardia  - Continue ongoing monitoring for bradycardia at all visits  - Last B12 level 776 (10/31/19)  - Continue B12 replacement via B12 1000 mcg QD    Essential hypertension  - BP today 175/73, above goal (<150/90) - chart indicates this is an outlier as most BP readings over the last month are at goal  - Continue HCTZ 12 5 mg QD, well-tolerated by patient  - BMP on 10/22 revealed Cr 0 86, K 3 7, stable on potassium supplementation 20 mEq BID so will continue        Allergic rhinitis  -Continue Flonase intranasal spray QD      No orders of the defined types were placed in this encounter  Chief Complaint     No chief complaint on file  History of Present Illness     HPI   Patient presents for health maintenance exam with no acute complaints  He was last seen on 12/9/21 by Dr Elif Haskins and Dr Lazaro Austin  Since then, he received two doses of [address] vaccine on 1/23 and 2/13  The following portions of the patient's history were reviewed and updated as appropriate: allergies, current medications, past family history, past medical history, past social history, past surgical history and problem list     Review of Systems     Review of Systems   Unable to perform ROS: Dementia       Active Problem List     Patient Active Problem List   Diagnosis    Dementia ([facility] [address] )    Left ventricular hypertrophy by electrocardiogram    Essential hypertension    Osteoarthritis    Pre-diabetes    OAB (overactive bladder)    Cervicalgia    Allergic rhinitis    Fluctuation of weight         Current Medications   Medications reviewed and updated in facility chart        Current Outpatient Medications:     acetaminophen (TYLENOL) 325 mg tablet, Take 2 tablets (650 mg total) by mouth every 6 (six) hours as needed for fever, Disp: 30 tablet, Rfl: 0    cyanocobalamin 1000 MCG tablet, Take 1 tablet (1,000 mcg total) by mouth daily, Disp: 30 tablet, Rfl: 0    diclofenac sodium (VOLTAREN) 1 %, APPLY 2GM POSTERIOR NECK AT BEDTIME, Disp: 100 g, Rfl: 10    donepezil (ARICEPT) 10 mg tablet, Take 10 mg by mouth daily at bedtime, Disp: , Rfl:     fluticasone (FLONASE) 50 mcg/act nasal spray, 1 spray into each nostril daily as needed for rhinitis (Nasal Congestion), Disp: 16 g, Rfl: 2    hydrochlorothiazide (HYDRODIURIL) 12 5 mg tablet, Take 1 tablet (12 5 mg total) by mouth daily, Disp: 30 tablet, Rfl: 0    Nutritional Supplements (ENSURE PO), Take by mouth 2 (two) times a day, Disp: , Rfl:     potassium chloride (K-DUR,KLOR-CON) 20 mEq tablet, Take 1 tablet (20 mEq total) by mouth 2 (two) times a day, Disp: 60 tablet, Rfl: 0     Objective     BP (!) 175/73   Pulse 66   Temp (!) 96 4 °F (35 8 °C)   Wt 98 4 kg (217 lb)   BMI 32 99 kg/m²     Physical Exam  Vitals signs reviewed  Constitutional:       Appearance: Normal appearance  HENT:      Head: Normocephalic and atraumatic  Right Ear: External ear normal       Left Ear: External ear normal       Nose: Nose normal       Mouth/Throat:      Mouth: Mucous membranes are dry  Eyes:      Extraocular Movements: Extraocular movements intact  Conjunctiva/sclera: Conjunctivae normal    Neck:      Musculoskeletal: Normal range of motion  Comments: Mild hypertonicity bilateral cervical paraspinals, nontender to palpation  Cardiovascular:      Rate and Rhythm: Normal rate and regular rhythm  Pulses: Normal pulses  Heart sounds: Normal heart sounds  Pulmonary:      Effort: Pulmonary effort is normal       Breath sounds: Normal breath sounds  Abdominal:      General: Abdomen is flat  Bowel sounds are normal       Palpations: Abdomen is soft  Musculoskeletal:      Right lower leg: No edema  Left lower leg: No edema  Skin:     General: Skin is warm and dry  Capillary Refill: Capillary refill takes less than 2 seconds     Neurological: Mental Status: He is alert  Mental status is at baseline  Comments: Pleasant, oriented to self only, cooperative   Psychiatric:         Mood and Affect: Mood normal          Behavior: Behavior normal          Pertinent Laboratory/Diagnostic Studies:  No new labs              Attestation signed by Lakesha Portillo MD at 2/24/2021  2:54 PM:  I discussed case with the resident and reviewed resident note  I was present at assisted living and supervised the resident  I agree with the resident management as it was presented to me  Wt recorded today appears stable at baseline, BMI 33  Will order FLP/CMP in April 2021

## 2021-02-24 NOTE — ASSESSMENT & PLAN NOTE
- Weight today 217 lbs, last visit two months ago was 234 lbs which was a significant increase from his prior weight    - No reported dietary or activity changes  - Monitor weight monthly  - Encourage healthy diet and activity as tolerated

## 2021-05-28 ENCOUNTER — TELEPHONE (OUTPATIENT)
Dept: FAMILY MEDICINE CLINIC | Facility: CLINIC | Age: 86
End: 2021-05-28

## 2021-05-28 NOTE — TELEPHONE ENCOUNTER
Received notice from [facility], they would like you to change times for Potc/Micro BID and Ensure BID to morning and bedtime

## 2021-06-01 NOTE — TELEPHONE ENCOUNTER
Request discussed with Pedro Hendricks in charge of nursing at facility and its due to need for less medication passes  As an [employer]

## 2021-06-09 ENCOUNTER — NURSING HOME VISIT (OUTPATIENT)
Dept: FAMILY MEDICINE CLINIC | Facility: CLINIC | Age: 86
End: 2021-06-09

## 2021-06-09 VITALS
SYSTOLIC BLOOD PRESSURE: 110 MMHG | DIASTOLIC BLOOD PRESSURE: 50 MMHG | BODY MASS INDEX: 32.99 KG/M2 | WEIGHT: 217 LBS | HEART RATE: 62 BPM | TEMPERATURE: 96.4 F

## 2021-06-09 DIAGNOSIS — I10 ESSENTIAL HYPERTENSION: ICD-10-CM

## 2021-06-09 DIAGNOSIS — J30.9 ALLERGIC RHINITIS, UNSPECIFIED SEASONALITY, UNSPECIFIED TRIGGER: Primary | ICD-10-CM

## 2021-06-09 DIAGNOSIS — G30.1 LATE ONSET ALZHEIMER'S DEMENTIA WITHOUT BEHAVIORAL DISTURBANCE (HCC): ICD-10-CM

## 2021-06-09 DIAGNOSIS — M15.9 OSTEOARTHRITIS OF MULTIPLE JOINTS, UNSPECIFIED OSTEOARTHRITIS TYPE: ICD-10-CM

## 2021-06-09 DIAGNOSIS — F02.80 LATE ONSET ALZHEIMER'S DEMENTIA WITHOUT BEHAVIORAL DISTURBANCE (HCC): ICD-10-CM

## 2021-06-09 DIAGNOSIS — M54.2 CERVICALGIA: ICD-10-CM

## 2021-06-09 PROBLEM — H04.123 DRY EYES, BILATERAL: Status: ACTIVE | Noted: 2021-06-09

## 2021-06-09 PROCEDURE — 99336 PR DOM/R-HOME E/M EST PT MOD HI SEVERITY 40 MINUTES: CPT | Performed by: FAMILY MEDICINE

## 2021-06-09 NOTE — ASSESSMENT & PLAN NOTE
- BP today 110/50 today, at goal    - Continue HCTZ 12 5 mg QD, well-tolerated by patient  - Last BMP on 10/22 revealed Cr 0 86, K 3 7, stable on potassium supplementation 20 mEq BID so will continue

## 2021-06-09 NOTE — ASSESSMENT & PLAN NOTE
- Patient stable from previous visit, remains pleasantly confused with stable mood, oriented to self only, no behavioral disturbances reported  - Continue Donepezil 10 mg QD, has been tolerated well with no bradycardia  - Continue ongoing monitoring for bradycardia at all visits  - Last B12 level 776 (10/31/19)  - Continue B12 replacement via B12 1000 mcg QD

## 2021-06-09 NOTE — ASSESSMENT & PLAN NOTE
- Weight today 217 lbs, stable from last visit  Had previously fluctuated as high as 234 which was likely an error  No reported dietary or activity changes     - Monitor weight monthly  - Encourage healthy diet and activity as tolerated

## 2021-06-09 NOTE — ASSESSMENT & PLAN NOTE
Eyes somewhat dry from watching TV - encouraged to take breaks  Will start artificial tears as needed for dry eyes

## 2021-06-09 NOTE — PROGRESS NOTES
Nursing Home Visit    NAME: Deloris Hector  AGE: 81 y o  SEX: male [phone]    DATE OF ENCOUNTER: 6/9/2021    Nursing home/assisted living name: [facility]  Patient room number: 611  Code status: Level 1 Full Code      Assessment and Plan     Problem List Items Addressed This Visit        Respiratory    RESOLVED: Allergic rhinitis - Primary       Cardiovascular and Mediastinum    Essential hypertension     - BP today 110/50 today, at goal    - Continue HCTZ 12 5 mg QD, well-tolerated by patient  - Last BMP on 10/22 revealed Cr 0 86, K 3 7, stable on potassium supplementation 20 mEq BID so will continue                  Nervous and Auditory    Dementia ([facility] [address] )     - Patient stable from previous visit, remains pleasantly confused with stable mood, oriented to self only, no behavioral disturbances reported  - Continue Donepezil 10 mg QD, has been tolerated well with no bradycardia  - Continue ongoing monitoring for bradycardia at all visits  - Last B12 level 776 (10/31/19)  - Continue B12 replacement via B12 1000 mcg QD            Musculoskeletal and Integument    Osteoarthritis     - Asymptomatic OA of knees  - Continue Tylenol 325 mg Q4H PRN for pain (TDD<3g/day)  - Continue Voltaren gel qHS            Other    Cervicalgia     - Patient reports chronic mild neck/shoulder discomfort  - Mild hypertonicity on exam but non-tender, full ROM in B/L UE   - Continue Tylenol 325 mg Q4H PRN for pain (TDD<3g/day)  - Continue Voltaren gel qHS               No orders of the defined types were placed in this encounter  Chief Complaint     No chief complaint on file  History of Present Illness     HPI    Patient presents for health maintenance exam  He was last seen on 2/24/21 by Dr Noé Art and Dr Shari Brunner  Since then, no major changes to his health status or new concerns  Today he is seen seated comfortably in his chair watching TV  He reports neck pain is very well controlled   He does not elicit any further complaints but is seen rubbing his eyes  When questioned, he states his eyes are dry when watching TV and he is agreeable to artificial tears  He has no further acute concerns  The following portions of the patient's history were reviewed and updated as appropriate: allergies, current medications, past family history, past medical history, past social history, past surgical history and problem list     Review of Systems     Review of Systems   Unable to perform ROS: Dementia       Active Problem List     Patient Active Problem List   Diagnosis    Dementia ([address] )    Left ventricular hypertrophy by electrocardiogram    Essential hypertension    Osteoarthritis    Pre-diabetes    OAB (overactive bladder)    Cervicalgia    Fluctuation of weight    Dry eyes, bilateral         Current Medications   Medications reviewed and updated in facility chart        Current Outpatient Medications:     acetaminophen (TYLENOL) 325 mg tablet, Take 2 tablets (650 mg total) by mouth every 6 (six) hours as needed for fever, Disp: 30 tablet, Rfl: 0    cyanocobalamin 1000 MCG tablet, Take 1 tablet (1,000 mcg total) by mouth daily, Disp: 30 tablet, Rfl: 0    diclofenac sodium (VOLTAREN) 1 %, APPLY 2GM POSTERIOR NECK AT BEDTIME, Disp: 100 g, Rfl: 10    donepezil (ARICEPT) 10 mg tablet, Take 10 mg by mouth daily at bedtime, Disp: , Rfl:     fluticasone (FLONASE) 50 mcg/act nasal spray, 1 spray into each nostril daily as needed for rhinitis (Nasal Congestion), Disp: 16 g, Rfl: 2    hydrochlorothiazide (HYDRODIURIL) 12 5 mg tablet, Take 1 tablet (12 5 mg total) by mouth daily, Disp: 30 tablet, Rfl: 0    Nutritional Supplements (ENSURE PO), Take by mouth 2 (two) times a day, Disp: , Rfl:     potassium chloride (K-DUR,KLOR-CON) 20 mEq tablet, Take 1 tablet (20 mEq total) by mouth 2 (two) times a day, Disp: 60 tablet, Rfl: 0     Objective     /50   Pulse 62   Temp (!) 96 4 °F (35 8 °C)   Wt 98 4 kg (217 lb)   BMI 32 99 kg/m²     Physical Exam  Vitals signs reviewed  Constitutional:       Appearance: Normal appearance  HENT:      Head: Normocephalic and atraumatic  Right Ear: External ear normal       Left Ear: External ear normal       Nose: Nose normal       Mouth/Throat:      Mouth: Mucous membranes are moist       Pharynx: Oropharynx is clear  Eyes:      Extraocular Movements: Extraocular movements intact  Conjunctiva/sclera: Conjunctivae normal       Comments: Dry eyes, slight tearing   Neck:      Musculoskeletal: Normal range of motion  Comments: Slight cervical paraspinal hypertonicity  Cardiovascular:      Rate and Rhythm: Normal rate and regular rhythm  Pulses: Normal pulses  Heart sounds: Normal heart sounds  Pulmonary:      Effort: Pulmonary effort is normal       Breath sounds: Normal breath sounds  Abdominal:      General: Bowel sounds are normal  There is no distension  Palpations: Abdomen is soft  Tenderness: There is no abdominal tenderness  Musculoskeletal: Normal range of motion  Skin:     General: Skin is warm and dry  Capillary Refill: Capillary refill takes less than 2 seconds  Neurological:      Mental Status: He is alert        Comments: Oriented to self only, at baseline   Psychiatric:         Mood and Affect: Mood normal          Behavior: Behavior normal          Pertinent Laboratory/Diagnostic Studies:  No new labs

## 2021-06-09 NOTE — LETTER
June 9, 2021     [facility]    Patient: Alejandra Walker   YOB: 1935   Date of Visit: 6/9/2021       Dear Dr Russ Awad:    Thank you for referring Alejandra Walker to me for evaluation  Below are my notes for this consultation  If you have questions, please do not hesitate to call me  I look forward to following your patient along with you  Sincerely,        China Richardson DO        CC: No Recipients  [facility]  6/9/2021 10:41 AM  Attested  Nursing Home Visit    NAME: Alejandra Walker  AGE: 81 y o   SEX: male [id number]    DATE OF ENCOUNTER: 6/9/2021    Nursing home/assisted living name: [facility]  Patient room number: 072  Code status: Level 1 Full Code      Assessment and Plan     Problem List Items Addressed This Visit        Respiratory    RESOLVED: Allergic rhinitis - Primary       Cardiovascular and Mediastinum    Essential hypertension     - BP today 110/50 today, at goal    - Continue HCTZ 12 5 mg QD, well-tolerated by patient  - Last BMP on 10/22 revealed Cr 0 86, K 3 7, stable on potassium supplementation 20 mEq BID so will continue                  Nervous and Auditory    Dementia ([facility] [address] )     - Patient stable from previous visit, remains pleasantly confused with stable mood, oriented to self only, no behavioral disturbances reported  - Continue Donepezil 10 mg QD, has been tolerated well with no bradycardia  - Continue ongoing monitoring for bradycardia at all visits  - Last B12 level 776 (10/31/19)  - Continue B12 replacement via B12 1000 mcg QD            Musculoskeletal and Integument    Osteoarthritis     - Asymptomatic OA of knees  - Continue Tylenol 325 mg Q4H PRN for pain (TDD<3g/day)  - Continue Voltaren gel qHS            Other    Cervicalgia     - Patient reports chronic mild neck/shoulder discomfort  - Mild hypertonicity on exam but non-tender, full ROM in B/L UE   - Continue Tylenol 325 mg Q4H PRN for pain (TDD<3g/day)  - Continue Voltaren gel qHS               No orders of the defined types were placed in this encounter  Chief Complaint     No chief complaint on file  History of Present Illness     HPI    Patient presents for health maintenance exam  He was last seen on 2/24/21 by Dr Raven Stauffer and Dr Fuentes Garcia  Since then, no major changes to his health status or new concerns  Today he is seen seated comfortably in his chair watching TV  He reports neck pain is very well controlled  He does not elicit any further complaints but is seen rubbing his eyes  When questioned, he states his eyes are dry when watching TV and he is agreeable to artificial tears  He has no further acute concerns  The following portions of the patient's history were reviewed and updated as appropriate: allergies, current medications, past family history, past medical history, past social history, past surgical history and problem list     Review of Systems     Review of Systems   Unable to perform ROS: Dementia       Active Problem List     Patient Active Problem List   Diagnosis    Dementia ([facility] [address] )    Left ventricular hypertrophy by electrocardiogram    Essential hypertension    Osteoarthritis    Pre-diabetes    OAB (overactive bladder)    Cervicalgia    Fluctuation of weight    Dry eyes, bilateral         Current Medications   Medications reviewed and updated in facility chart        Current Outpatient Medications:     acetaminophen (TYLENOL) 325 mg tablet, Take 2 tablets (650 mg total) by mouth every 6 (six) hours as needed for fever, Disp: 30 tablet, Rfl: 0    cyanocobalamin 1000 MCG tablet, Take 1 tablet (1,000 mcg total) by mouth daily, Disp: 30 tablet, Rfl: 0    diclofenac sodium (VOLTAREN) 1 %, APPLY 2GM POSTERIOR NECK AT BEDTIME, Disp: 100 g, Rfl: 10    donepezil (ARICEPT) 10 mg tablet, Take 10 mg by mouth daily at bedtime, Disp: , Rfl:     fluticasone (FLONASE) 50 mcg/act nasal spray, 1 spray into each nostril daily as needed for rhinitis (Nasal Congestion), Disp: 16 g, Rfl: 2   hydrochlorothiazide (HYDRODIURIL) 12 5 mg tablet, Take 1 tablet (12 5 mg total) by mouth daily, Disp: 30 tablet, Rfl: 0    Nutritional Supplements (ENSURE PO), Take by mouth 2 (two) times a day, Disp: , Rfl:     potassium chloride (K-DUR,KLOR-CON) 20 mEq tablet, Take 1 tablet (20 mEq total) by mouth 2 (two) times a day, Disp: 60 tablet, Rfl: 0     Objective     /50   Pulse 62   Temp (!) 96 4 °F (35 8 °C)   Wt 98 4 kg (217 lb)   BMI 32 99 kg/m²     Physical Exam  Vitals signs reviewed  Constitutional:       Appearance: Normal appearance  HENT:      Head: Normocephalic and atraumatic  Right Ear: External ear normal       Left Ear: External ear normal       Nose: Nose normal       Mouth/Throat:      Mouth: Mucous membranes are moist       Pharynx: Oropharynx is clear  Eyes:      Extraocular Movements: Extraocular movements intact  Conjunctiva/sclera: Conjunctivae normal       Comments: Dry eyes, slight tearing   Neck:      Musculoskeletal: Normal range of motion  Comments: Slight cervical paraspinal hypertonicity  Cardiovascular:      Rate and Rhythm: Normal rate and regular rhythm  Pulses: Normal pulses  Heart sounds: Normal heart sounds  Pulmonary:      Effort: Pulmonary effort is normal       Breath sounds: Normal breath sounds  Abdominal:      General: Bowel sounds are normal  There is no distension  Palpations: Abdomen is soft  Tenderness: There is no abdominal tenderness  Musculoskeletal: Normal range of motion  Skin:     General: Skin is warm and dry  Capillary Refill: Capillary refill takes less than 2 seconds  Neurological:      Mental Status: He is alert        Comments: Oriented to self only, at baseline   Psychiatric:         Mood and Affect: Mood normal          Behavior: Behavior normal          Pertinent Laboratory/Diagnostic Studies:  No new labs                  Attestation signed by Mary Espino MD at 6/9/2021 11:38 AM:  I discussed case with the resident and reviewed resident note  I was present at assisted living and supervised the resident  I agree with the resident management as it was presented to me  Will monitor BP weekly for need to continue HCTZ and will order labs  Start artificial tears daily

## 2021-08-11 ENCOUNTER — NURSING HOME VISIT (OUTPATIENT)
Dept: FAMILY MEDICINE CLINIC | Facility: CLINIC | Age: 86
End: 2021-08-11

## 2021-08-11 VITALS
DIASTOLIC BLOOD PRESSURE: 75 MMHG | BODY MASS INDEX: 33.45 KG/M2 | WEIGHT: 220 LBS | TEMPERATURE: 97.7 F | HEART RATE: 62 BPM | SYSTOLIC BLOOD PRESSURE: 156 MMHG

## 2021-08-11 DIAGNOSIS — H04.123 DRY EYES, BILATERAL: ICD-10-CM

## 2021-08-11 DIAGNOSIS — M54.2 CERVICALGIA: ICD-10-CM

## 2021-08-11 DIAGNOSIS — R68.89 FLUCTUATION OF WEIGHT: ICD-10-CM

## 2021-08-11 DIAGNOSIS — I10 ESSENTIAL HYPERTENSION: Primary | ICD-10-CM

## 2021-08-11 DIAGNOSIS — R73.03 PRE-DIABETES: ICD-10-CM

## 2021-08-11 DIAGNOSIS — M15.9 OSTEOARTHRITIS OF MULTIPLE JOINTS, UNSPECIFIED OSTEOARTHRITIS TYPE: ICD-10-CM

## 2021-08-11 DIAGNOSIS — F02.80 LATE ONSET ALZHEIMER'S DEMENTIA WITHOUT BEHAVIORAL DISTURBANCE (HCC): ICD-10-CM

## 2021-08-11 DIAGNOSIS — G30.1 LATE ONSET ALZHEIMER'S DEMENTIA WITHOUT BEHAVIORAL DISTURBANCE (HCC): ICD-10-CM

## 2021-08-11 PROCEDURE — 99336 PR DOM/R-HOME E/M EST PT MOD HI SEVERITY 40 MINUTES: CPT | Performed by: FAMILY MEDICINE

## 2021-08-11 NOTE — PROGRESS NOTES
Nursing Home Visit    NAME: Jacey Umanzor  AGE: 81 y o   SEX: male [phone]    DATE OF ENCOUNTER: 8/11/2021    Nursing home/assisted living name:  Patient room number:  Code status: Level 1 Full Code      Assessment and Plan     Problem List Items Addressed This Visit        Cardiovascular and Mediastinum    Essential hypertension - Primary     Labile BP   - BP today 156/57, worsened from prior 110/50 on 6/9/21  - Average /67  - Goal BP < 150/90  - C/w HCTZ 12 5mg daily  - Last CMP 6/24/21 - Na 143, K 3 7, Cl 108, HCO3 29, BUN 13, Cr 0 9  - C/w KDUR 20mEq BID  - Daily BP check  - Consider addition Lisinopril 5mg daily if BP persistently above goal    BP Readings from Last 3 Encounters:   08/11/21 156/57   06/09/21 110/50   02/24/21 (!) 175/73               Nervous and Auditory    Dementia ([facility] [address] )     Stable compared to prior  - Awake, alert, conversational, orientated to name only  - Pleasantly confused at baseline, no behavioral disturbance  - C/w Donepazil 10mg qHS  - C/w monitoring for bradycardia (avg HR 64)  - Last B12 level 776 (10/31/19)  - C/w B12 1mg daily            Musculoskeletal and Integument    Osteoarthritis     OA of bilateral knees  - Asymptomatic at this encounter  - Able to ambulate independently within nursing home  - C/w Tylenol 325mg q4hr PRN for pain (TDD<3g/day)  - C/w Voltaren gel qHS            Other    Pre-diabetes     Stable  - Last A1C 5 9% on 4/30/19, repeat ordered in June 2020 however not completed  - No need to check HgbA1c at this time, fasting BG 74 on 6/10 and was 88 on 10/22/20  - Encouraged to continue healthy diet including Ensure supplement         Cervicalgia     Intermittent neck pain  - Asymptomatic today  - Pt reported chronic mild neck/shoulder discomfort on 6/9/21  - Neck exam showed mildly decreased neck ROM, no neck stiffness, non-tender, normal upper-extremities exam  - C/w Tylenol 325 mg q4hr PRN for pain (TDD<3g/day)  - C/w Voltaren gel qHS         Fluctuation of weight     Stable  - Weight 220 lbs today, increased 3lbs from 217 on 6/9/21  - No reported dietary or activity changes  - Monitor weight monthly  - Encourage healthy diet and activity as tolerated    Wt Readings from Last 3 Encounters:   08/11/21 99 8 kg (220 lb)   06/09/21 98 4 kg (217 lb)   02/24/21 98 4 kg (217 lb)            Dry eyes, bilateral     Dry eyes from prolonged TV watching  - Asymptomatic today, no eye-rubbing or frequently blinking noted  - Encourage frequent breaks for eye rest  - C/w artificial tears PRN for dry eyes                 No orders of the defined types were placed in this encounter  Chief Complaint     No chief complaint on file  History of Present Illness     HPI     86YOM presents for health maintenance exam  He was last seen on 6/9/21 by Dr Bard Caicedo and Dr Emy Bee  Since then, no major changes to his health status or new concerns  Today he is seen seated comfortably in his chair watching TV  Pt states he dislike the taste of his food here and is bored for being here too long but has no physical concerns  He denies any neck pain at this encounter  Also denies any headache, blurry vision, dizziness, nausea, vomiting, chest pain, shortness of breath, abdominal pain, dysuria, urinary urgency/incontinence, diarrhea, constipation, skin rash, or joint pain  No questions/concerns at this time  The following portions of the patient's history were reviewed and updated as appropriate: allergies, current medications, past family history, past medical history, past social history, past surgical history and problem list     Review of Systems     Review of Systems   Constitutional: Negative for activity change, appetite change, chills, fatigue and fever  HENT: Negative for ear discharge, ear pain, facial swelling, hearing loss, postnasal drip, rhinorrhea, sore throat, tinnitus and voice change      Eyes: Negative for pain, discharge, redness and visual disturbance  Respiratory: Negative for apnea, cough, choking, chest tightness, shortness of breath, wheezing and stridor  Cardiovascular: Negative for chest pain and palpitations  Gastrointestinal: Negative for abdominal distention, abdominal pain, constipation, diarrhea, nausea and vomiting  Genitourinary: Negative for dysuria  Musculoskeletal: Negative for arthralgias, neck pain and neck stiffness  Neurological: Negative for tremors, seizures and weakness  Psychiatric/Behavioral: Negative for agitation, behavioral problems and confusion  Active Problem List     Patient Active Problem List   Diagnosis    Dementia ([facility] [address]    Left ventricular hypertrophy by electrocardiogram    Essential hypertension    Osteoarthritis    Pre-diabetes    OAB (overactive bladder)    Cervicalgia    Fluctuation of weight    Dry eyes, bilateral         Current Medications   Medications reviewed and updated in facility chart        Current Outpatient Medications:     acetaminophen (TYLENOL) 325 mg tablet, Take 2 tablets (650 mg total) by mouth every 6 (six) hours as needed for fever, Disp: 30 tablet, Rfl: 0    cyanocobalamin 1000 MCG tablet, Take 1 tablet (1,000 mcg total) by mouth daily, Disp: 30 tablet, Rfl: 0    diclofenac sodium (VOLTAREN) 1 %, APPLY 2GM POSTERIOR NECK AT BEDTIME, Disp: 100 g, Rfl: 10    donepezil (ARICEPT) 10 mg tablet, Take 10 mg by mouth daily at bedtime, Disp: , Rfl:     fluticasone (FLONASE) 50 mcg/act nasal spray, 1 spray into each nostril daily as needed for rhinitis (Nasal Congestion), Disp: 16 g, Rfl: 2    hydrochlorothiazide (HYDRODIURIL) 12 5 mg tablet, Take 1 tablet (12 5 mg total) by mouth daily, Disp: 30 tablet, Rfl: 0    Nutritional Supplements (ENSURE PO), Take by mouth 2 (two) times a day, Disp: , Rfl:     potassium chloride (K-DUR,KLOR-CON) 20 mEq tablet, Take 1 tablet (20 mEq total) by mouth 2 (two) times a day, Disp: 60 tablet, Rfl: 0     Objective     /75   Pulse 62   Temp 97 7 °F (36 5 °C)   Wt 99 8 kg (220 lb)   BMI 33 45 kg/m²      Vitals:    08/11/21 1611   BP: 156/75   Pulse: 62   Temp: 97 7 °F (36 5 °C)         Physical Exam:    General: Pt observed sitting comfortably in chair watching TV, NAD  Not toxic/ill-appearing  No cachectic or diaphoresis  No obvious sign of trauma or bleeding  Psych: Awake, alert, conversational, orientated to name only  Pleasantly confused at baseline with stable mood  Neuro: no gross neurological deficits, CN 2-12 grossly intact, no  tremor, no pronator drift, normal DTR  Head: atraumatic, normocephalic  Eyes: open spontaneously, EOM intact, CHIRAG, conjunctiva non-injected, no scleral icterus, no discharge  Ear: normal external ear, no visible drainage at external auditory orifice  Nose: clear, no epistaxis, no rhinorrhea  Throat: clear, no hoarse voice, no cough  Neck: supple, normal ROM  Heart: RRR, no murmur/distant heart sound appreciated  Lungs: LCTABL, nml respiratory effort, no agonal/labored breathing, no accessory muscle use  Abdomen: soft, nontender, nondistended, normal bowel sound  Extremities: +trace bilateral edema, +4 strengths b/l  Strong radial/pedal pulses   No weakness/paresthesia        Pertinent Laboratory/Diagnostic Studies:  · CMP 6/24/21 - Na 143, K 3 7, Cl 108, HCO3 29, BUN 13, Cr 0 9, Gluc 74, AST 11, ALT 17  · Lipid panel 6/24/21 - Chol 197, , HDL 45, Non-

## 2021-08-11 NOTE — ASSESSMENT & PLAN NOTE
OA of bilateral knees  - Asymptomatic at this encounter  - Able to ambulate independently within nursing home  - C/w Tylenol 325mg q4hr PRN for pain (TDD<3g/day)  - C/w Voltaren gel qHS

## 2021-08-11 NOTE — LETTER
August 11, 2021     [facility]    Patient: Warner Fabry   YOB: 1935   Date of Visit: 8/11/2021       Dear Dr Sherry Leyva: Thank you for referring Warner Fabry to me for evaluation  Below are my notes for this consultation  If you have questions, please do not hesitate to call me  I look forward to following your patient along with you  Sincerely,        Lyn Stauffer MD        CC: No Recipients  Lyn Stauffer MD  8/11/2021  4:12 PM  Attested Addendum                                                             Nursing Home Visit    NAME: Warner Fabry  AGE: 81 y o   SEX: male [id number]    DATE OF ENCOUNTER: 8/11/2021    Nursing home/assisted living name:  Patient room number:  Code status: Level 1 Full Code      Assessment and Plan     Problem List Items Addressed This Visit        Cardiovascular and Mediastinum    Essential hypertension - Primary     Labile BP   - BP today 156/57, worsened from prior 110/50 on 6/9/21  - Average /67  - Goal BP < 150/90  - C/w HCTZ 12 5mg daily  - Last CMP 6/24/21 - Na 143, K 3 7, Cl 108, HCO3 29, BUN 13, Cr 0 9  - C/w KDUR 20mEq BID  - Daily BP check  - Consider addition Lisinopril 5mg daily if BP persistently above goal    BP Readings from Last 3 Encounters:   08/11/21 156/57   06/09/21 110/50   02/24/21 (!) 175/73               Nervous and Auditory    Dementia ([facility])     Stable compared to prior  - Awake, alert, conversational, orientated to name only  - Pleasantly confused at baseline, no behavioral disturbance  - C/w Donepazil 10mg qHS  - C/w monitoring for bradycardia (avg HR 64)  - Last B12 level 776 (10/31/19)  - C/w B12 1mg daily            Musculoskeletal and Integument    Osteoarthritis     OA of bilateral knees  - Asymptomatic at this encounter  - Able to ambulate independently within nursing home  - C/w Tylenol 325mg q4hr PRN for pain (TDD<3g/day)  - C/w Voltaren gel qHS            Other    Pre-diabetes     Stable  - Last A1C 5 9% on 4/30/19, repeat ordered in June 2020 however not completed  - No need to check HgbA1c at this time, fasting BG 74 on 6/10 and was 88 on 10/22/20  - Encouraged to continue healthy diet including Ensure supplement         Cervicalgia     Intermittent neck pain  - Asymptomatic today  - Pt reported chronic mild neck/shoulder discomfort on 6/9/21  - Neck exam showed mildly decreased neck ROM, no neck stiffness, non-tender, normal upper-extremities exam  - C/w Tylenol 325 mg q4hr PRN for pain (TDD<3g/day)  - C/w Voltaren gel qHS         Fluctuation of weight     Stable  - Weight 220 lbs today, increased 3lbs from 217 on 6/9/21  - No reported dietary or activity changes  - Monitor weight monthly  - Encourage healthy diet and activity as tolerated    Wt Readings from Last 3 Encounters:   08/11/21 99 8 kg (220 lb)   06/09/21 98 4 kg (217 lb)   02/24/21 98 4 kg (217 lb)            Dry eyes, bilateral     Dry eyes from prolonged TV watching  - Asymptomatic today, no eye-rubbing or frequently blinking noted  - Encourage frequent breaks for eye rest  - C/w artificial tears PRN for dry eyes                 No orders of the defined types were placed in this encounter  Chief Complaint     No chief complaint on file  History of Present Illness     HPI     86YOM presents for health maintenance exam  He was last seen on 6/9/21 by Dr Pavan Sanon and Dr Griffin Huffman  Since then, no major changes to his health status or new concerns  Today he is seen seated comfortably in his chair watching TV  Pt states he dislike the taste of his food here and is bored for being here too long but has no physical concerns  He denies any neck pain at this encounter  Also denies any headache, blurry vision, dizziness, nausea, vomiting, chest pain, shortness of breath, abdominal pain, dysuria, urinary urgency/incontinence, diarrhea, constipation, skin rash, or joint pain  No questions/concerns at this time        The following portions of the patient's history were reviewed and updated as appropriate: allergies, current medications, past family history, past medical history, past social history, past surgical history and problem list     Review of Systems     Review of Systems   Constitutional: Negative for activity change, appetite change, chills, fatigue and fever  HENT: Negative for ear discharge, ear pain, facial swelling, hearing loss, postnasal drip, rhinorrhea, sore throat, tinnitus and voice change  Eyes: Negative for pain, discharge, redness and visual disturbance  Respiratory: Negative for apnea, cough, choking, chest tightness, shortness of breath, wheezing and stridor  Cardiovascular: Negative for chest pain and palpitations  Gastrointestinal: Negative for abdominal distention, abdominal pain, constipation, diarrhea, nausea and vomiting  Genitourinary: Negative for dysuria  Musculoskeletal: Negative for arthralgias, neck pain and neck stiffness  Neurological: Negative for tremors, seizures and weakness  Psychiatric/Behavioral: Negative for agitation, behavioral problems and confusion  Active Problem List     Patient Active Problem List   Diagnosis    Dementia ([facility] [address]    Left ventricular hypertrophy by electrocardiogram    Essential hypertension    Osteoarthritis    Pre-diabetes    OAB (overactive bladder)    Cervicalgia    Fluctuation of weight    Dry eyes, bilateral         Current Medications   Medications reviewed and updated in facility chart        Current Outpatient Medications:     acetaminophen (TYLENOL) 325 mg tablet, Take 2 tablets (650 mg total) by mouth every 6 (six) hours as needed for fever, Disp: 30 tablet, Rfl: 0    cyanocobalamin 1000 MCG tablet, Take 1 tablet (1,000 mcg total) by mouth daily, Disp: 30 tablet, Rfl: 0    diclofenac sodium (VOLTAREN) 1 %, APPLY 2GM POSTERIOR NECK AT BEDTIME, Disp: 100 g, Rfl: 10    donepezil (ARICEPT) 10 mg tablet, Take 10 mg by mouth daily at bedtime, Disp: , Rfl:     fluticasone (FLONASE) 50 mcg/act nasal spray, 1 spray into each nostril daily as needed for rhinitis (Nasal Congestion), Disp: 16 g, Rfl: 2    hydrochlorothiazide (HYDRODIURIL) 12 5 mg tablet, Take 1 tablet (12 5 mg total) by mouth daily, Disp: 30 tablet, Rfl: 0    Nutritional Supplements (ENSURE PO), Take by mouth 2 (two) times a day, Disp: , Rfl:     potassium chloride (K-DUR,KLOR-CON) 20 mEq tablet, Take 1 tablet (20 mEq total) by mouth 2 (two) times a day, Disp: 60 tablet, Rfl: 0     Objective     /75   Pulse 62   Temp 97 7 °F (36 5 °C)   Wt 99 8 kg (220 lb)   BMI 33 45 kg/m²      Vitals:    08/11/21 1611   BP: 156/75   Pulse: 62   Temp: 97 7 °F (36 5 °C)         Physical Exam:    General: Pt observed sitting comfortably in chair watching TV, NAD  Not toxic/ill-appearing  No cachectic or diaphoresis  No obvious sign of trauma or bleeding  Psych: Awake, alert, conversational, orientated to name only  Pleasantly confused at baseline with stable mood  Neuro: no gross neurological deficits, CN 2-12 grossly intact, no  tremor, no pronator drift, normal DTR  Head: atraumatic, normocephalic  Eyes: open spontaneously, EOM intact, CHIRAG, conjunctiva non-injected, no scleral icterus, no discharge  Ear: normal external ear, no visible drainage at external auditory orifice  Nose: clear, no epistaxis, no rhinorrhea  Throat: clear, no hoarse voice, no cough  Neck: supple, normal ROM  Heart: RRR, no murmur/distant heart sound appreciated  Lungs: LCTABL, nml respiratory effort, no agonal/labored breathing, no accessory muscle use  Abdomen: soft, nontender, nondistended, normal bowel sound  Extremities: +trace bilateral edema, +4 strengths b/l  Strong radial/pedal pulses   No weakness/paresthesia        Pertinent Laboratory/Diagnostic Studies:  · CMP 6/24/21 - Na 143, K 3 7, Cl 108, HCO3 29, BUN 13, Cr 0 9, Gluc 74, AST 11, ALT 17  · Lipid panel 6/24/21 - Chol 197, , HDL 45, Non-HDL 152        Attestation signed by Mehdi Haque MD at 8/11/2021  4:44 PM:   I discussed case with the resident and reviewed resident note  I was present at assisted living and supervised the resident  I agree with the resident management as it was presented to me  Monitor BP for need to add Lisinopril  Reviewed recent FLP, no need to start statin  Monitor Wt on healthier diet

## 2021-08-11 NOTE — ASSESSMENT & PLAN NOTE
Dry eyes from prolonged TV watching  - Asymptomatic today, no eye-rubbing or frequently blinking noted  - Encourage frequent breaks for eye rest  - C/w artificial tears PRN for dry eyes

## 2021-08-11 NOTE — ASSESSMENT & PLAN NOTE
Stable compared to prior  - Awake, alert, conversational, orientated to name only  - Pleasantly confused at baseline, no behavioral disturbance  - C/w Donepazil 10mg qHS  - C/w monitoring for bradycardia (avg HR 64)  - Last B12 level 776 (10/31/19)  - C/w B12 1mg daily

## 2021-08-11 NOTE — ASSESSMENT & PLAN NOTE
Intermittent neck pain  - Asymptomatic today  - Pt reported chronic mild neck/shoulder discomfort on 6/9/21  - Neck exam showed mildly decreased neck ROM, no neck stiffness, non-tender, normal upper-extremities exam  - C/w Tylenol 325 mg q4hr PRN for pain (TDD<3g/day)  - C/w Voltaren gel qHS

## 2021-08-11 NOTE — ASSESSMENT & PLAN NOTE
Labile BP   - BP today 156/57, worsened from prior 110/50 on 6/9/21  - Average /67  - Goal BP < 150/90  - C/w HCTZ 12 5mg daily  - Last CMP 6/24/21 - Na 143, K 3 7, Cl 108, HCO3 29, BUN 13, Cr 0 9  - C/w KDUR 20mEq BID  - Daily BP check  - Consider addition Lisinopril 5mg daily if BP persistently above goal    BP Readings from Last 3 Encounters:   08/11/21 156/57   06/09/21 110/50   02/24/21 (!) 175/73

## 2021-08-11 NOTE — ASSESSMENT & PLAN NOTE
Stable  - Last A1C 5 9% on 4/30/19, repeat ordered in June 2020 however not completed  - No need to check HgbA1c at this time, fasting BG 74 on 6/10 and was 88 on 10/22/20  - Encouraged to continue healthy diet including Ensure supplement

## 2021-08-11 NOTE — ASSESSMENT & PLAN NOTE
Stable    - Weight 220 lbs today, increased 3lbs from 217 on 6/9/21  - No reported dietary or activity changes  - Monitor weight monthly  - Encourage healthy diet and activity as tolerated    Wt Readings from Last 3 Encounters:   08/11/21 99 8 kg (220 lb)   06/09/21 98 4 kg (217 lb)   02/24/21 98 4 kg (217 lb)

## 2021-10-12 PROBLEM — R73.01 IFG (IMPAIRED FASTING GLUCOSE): Status: ACTIVE | Noted: 2021-10-12

## 2021-10-12 PROBLEM — R73.03 PRE-DIABETES: Status: RESOLVED | Noted: 2019-10-30 | Resolved: 2021-10-12

## 2021-10-13 ENCOUNTER — NURSING HOME VISIT (OUTPATIENT)
Dept: FAMILY MEDICINE CLINIC | Facility: CLINIC | Age: 86
End: 2021-10-13

## 2021-10-13 VITALS
SYSTOLIC BLOOD PRESSURE: 140 MMHG | WEIGHT: 224.6 LBS | DIASTOLIC BLOOD PRESSURE: 75 MMHG | HEART RATE: 67 BPM | TEMPERATURE: 98.3 F | BODY MASS INDEX: 34.15 KG/M2

## 2021-10-13 DIAGNOSIS — R68.89 FLUCTUATION OF WEIGHT: ICD-10-CM

## 2021-10-13 DIAGNOSIS — M15.9 OSTEOARTHRITIS OF MULTIPLE JOINTS, UNSPECIFIED OSTEOARTHRITIS TYPE: ICD-10-CM

## 2021-10-13 DIAGNOSIS — I10 ESSENTIAL HYPERTENSION: Primary | ICD-10-CM

## 2021-10-13 DIAGNOSIS — H04.123 DRY EYES, BILATERAL: ICD-10-CM

## 2021-10-13 DIAGNOSIS — G30.1 LATE ONSET ALZHEIMER'S DEMENTIA WITHOUT BEHAVIORAL DISTURBANCE (HCC): ICD-10-CM

## 2021-10-13 DIAGNOSIS — F02.80 LATE ONSET ALZHEIMER'S DEMENTIA WITHOUT BEHAVIORAL DISTURBANCE (HCC): ICD-10-CM

## 2021-10-13 PROBLEM — R73.01 IFG (IMPAIRED FASTING GLUCOSE): Status: RESOLVED | Noted: 2021-10-12 | Resolved: 2021-10-13

## 2021-10-13 PROCEDURE — 99336 PR DOM/R-HOME E/M EST PT MOD HI SEVERITY 40 MINUTES: CPT | Performed by: FAMILY MEDICINE

## 2021-11-17 DIAGNOSIS — Z23 ENCOUNTER FOR IMMUNIZATION: Primary | ICD-10-CM

## 2021-11-17 PROCEDURE — G0008 ADMIN INFLUENZA VIRUS VAC: HCPCS | Performed by: FAMILY MEDICINE

## 2021-11-17 PROCEDURE — 90662 IIV NO PRSV INCREASED AG IM: CPT | Performed by: FAMILY MEDICINE

## 2021-12-22 ENCOUNTER — NURSING HOME VISIT (OUTPATIENT)
Dept: FAMILY MEDICINE CLINIC | Facility: CLINIC | Age: 86
End: 2021-12-22

## 2021-12-22 VITALS
DIASTOLIC BLOOD PRESSURE: 72 MMHG | WEIGHT: 224 LBS | SYSTOLIC BLOOD PRESSURE: 128 MMHG | BODY MASS INDEX: 34.06 KG/M2 | TEMPERATURE: 97.3 F | HEART RATE: 68 BPM

## 2021-12-22 DIAGNOSIS — M15.9 OSTEOARTHRITIS OF MULTIPLE JOINTS, UNSPECIFIED OSTEOARTHRITIS TYPE: ICD-10-CM

## 2021-12-22 DIAGNOSIS — F02.80 LATE ONSET ALZHEIMER'S DEMENTIA WITHOUT BEHAVIORAL DISTURBANCE (HCC): ICD-10-CM

## 2021-12-22 DIAGNOSIS — G30.1 LATE ONSET ALZHEIMER'S DEMENTIA WITHOUT BEHAVIORAL DISTURBANCE (HCC): ICD-10-CM

## 2021-12-22 DIAGNOSIS — R68.89 FLUCTUATION OF WEIGHT: ICD-10-CM

## 2021-12-22 DIAGNOSIS — I10 ESSENTIAL HYPERTENSION: Primary | ICD-10-CM

## 2021-12-22 DIAGNOSIS — H04.123 DRY EYES, BILATERAL: ICD-10-CM

## 2021-12-22 RX ORDER — POLYVINYL ALCOHOL 14 MG/ML
1 SOLUTION/ DROPS OPHTHALMIC DAILY
COMMUNITY

## 2021-12-22 NOTE — ASSESSMENT & PLAN NOTE
Stable weight.   Last weighed on 12/3 and was 224.lbs  Previously: 217>217> 220> 224.6 (today)  No reported dietary or activity changes  Continue Ensures BID and monitor weight monthly

## 2021-12-22 NOTE — ASSESSMENT & PLAN NOTE
- BP stable and  at goal  - BP today: 128/72  - Average BP:  132/79  -Continue HCTZ 12.5 mg daily and KDUR 20mEq BID  - Last CMP 6/24/21 - Na 143, K 3.7, Cl 108, HCO3 29, BUN 13, Cr 0.9  - Continue blood pressure monitoring weekly

## 2021-12-22 NOTE — ASSESSMENT & PLAN NOTE
- History of Dementia which is stable. No reports of behavioral disturbances. Patient is oriented to self only.    - Continue Donepezil 10 mg QD  - Continue to monitoring for bradycardia. HR today: 68  - Last B12 level 776 (10/31/19)  - Continue B12 replacement via B12 1000 mcg QD

## 2021-12-22 NOTE — ASSESSMENT & PLAN NOTE
- History of OA of knees. No complaints today.  - Continue Tylenol 325 mg Q4H PRN for pain and Voltaren gel qHS

## 2021-12-22 NOTE — PROGRESS NOTES
Nursing Home Visit    NAME: David Cintron  AGE: 86 y.o. SEX: male [phone]    DATE OF ENCOUNTER: 12/22/2021    Nursing home/assisted living name: [facility]  Patient room number: 223  Code status: Level 1 Full Code      Assessment and Plan     Problem List Items Addressed This Visit        Cardiovascular and Mediastinum    Essential hypertension - Primary     - BP stable and  at goal  - BP today: 128/72  - Average BP:  132/79  -Continue HCTZ 12.5 mg daily and KDUR 20mEq BID  - Last CMP 6/24/21 - Na 143, K 3.7, Cl 108, HCO3 29, BUN 13, Cr 0.9  - Continue blood pressure monitoring weekly            Nervous and Auditory    Dementia (HCC)     - History of Dementia which is stable. No reports of behavioral disturbances. Patient is oriented to self only.    - Continue Donepezil 10 mg QD  - Continue to monitoring for bradycardia. HR today: 68  - Last B12 level 776 (10/31/19)  - Continue B12 replacement via B12 1000 mcg QD            Musculoskeletal and Integument    Osteoarthritis     - History of OA of knees. No complaints today.  - Continue Tylenol 325 mg Q4H PRN for pain and Voltaren gel qHS            Other    Fluctuation of weight     Stable weight.   Last weighed on 12/3 and was 224.lbs  Previously: 217>217> 220> 224.6 (today)  No reported dietary or activity changes  Continue Ensures BID and monitor weight monthly         Dry eyes, bilateral     Stable and asymptomatic.  Continue artifical tears PRN for dry eyes.               No orders of the defined types were placed in this encounter.        Chief Complaint     No chief complaint on file.      History of Present Illness     HPI   Patient presents today for health maintenance exam. He was last seen by Dr. More with Dr. Rodriguez on 10/13/21. Since then, he received his flu vaccine on 11/17/21. He has no acute concerns at this time.     The following portions of the patient's history were reviewed and updated as appropriate: allergies, current medications,  past family history, past medical history, past social history, past surgical history and problem list.    Review of Systems     Review of Systems   Unable to perform ROS: Dementia       Active Problem List     Patient Active Problem List   Diagnosis   • Dementia (HCC)   • Left ventricular hypertrophy by electrocardiogram   • Essential hypertension   • Osteoarthritis   • OAB (overactive bladder)   • Cervicalgia   • Fluctuation of weight   • Dry eyes, bilateral         Current Medications   Medications reviewed and updated in facility chart.      Current Outpatient Medications:   •  polyvinyl alcohol (LIQUIFILM TEARS) 1.4 % ophthalmic solution, Administer 1 drop to both eyes daily, Disp: , Rfl:   •  acetaminophen (TYLENOL) 325 mg tablet, Take 2 tablets (650 mg total) by mouth every 6 (six) hours as needed for fever, Disp: 30 tablet, Rfl: 0  •  cyanocobalamin 1000 MCG tablet, Take 1 tablet (1,000 mcg total) by mouth daily, Disp: 30 tablet, Rfl: 0  •  diclofenac sodium (VOLTAREN) 1 %, APPLY 2GM POSTERIOR NECK AT BEDTIME, Disp: 100 g, Rfl: 10  •  donepezil (ARICEPT) 10 mg tablet, Take 10 mg by mouth daily at bedtime, Disp: , Rfl:   •  fluticasone (FLONASE) 50 mcg/act nasal spray, 1 spray into each nostril daily as needed for rhinitis (Nasal Congestion), Disp: 16 g, Rfl: 2  •  hydrochlorothiazide (HYDRODIURIL) 12.5 mg tablet, Take 1 tablet (12.5 mg total) by mouth daily, Disp: 30 tablet, Rfl: 0  •  Nutritional Supplements (ENSURE PO), Take by mouth 2 (two) times a day, Disp: , Rfl:   •  potassium chloride (K-DUR,KLOR-CON) 20 mEq tablet, Take 1 tablet (20 mEq total) by mouth 2 (two) times a day, Disp: 60 tablet, Rfl: 0     Objective     /72   Pulse 68   Temp (!) 97.3 °F (36.3 °C)   Wt 102 kg (224 lb)   BMI 34.06 kg/m²     Physical Exam  Vitals reviewed.   Constitutional:       Appearance: Normal appearance.   HENT:      Head: Normocephalic and atraumatic.      Right Ear: External ear normal.      Left Ear:  External ear normal.      Nose: Nose normal.      Mouth/Throat:      Mouth: Mucous membranes are moist.      Pharynx: Oropharynx is clear.   Eyes:      Extraocular Movements: Extraocular movements intact.      Conjunctiva/sclera: Conjunctivae normal.   Cardiovascular:      Rate and Rhythm: Normal rate and regular rhythm.      Pulses: Normal pulses.      Heart sounds: Normal heart sounds.   Pulmonary:      Effort: Pulmonary effort is normal.      Breath sounds: Normal breath sounds.   Abdominal:      General: Bowel sounds are normal. There is no distension.      Palpations: Abdomen is soft.      Tenderness: There is no abdominal tenderness.   Skin:     General: Skin is warm and dry.   Neurological:      General: No focal deficit present.      Mental Status: He is alert.      Comments: Oriented to self only   Psychiatric:         Mood and Affect: Mood normal.         Pertinent Laboratory/Diagnostic Studies:  None new - last labs 6/10/21

## 2022-03-09 ENCOUNTER — NURSING HOME VISIT (OUTPATIENT)
Dept: FAMILY MEDICINE CLINIC | Facility: CLINIC | Age: 87
End: 2022-03-09

## 2022-03-09 VITALS
DIASTOLIC BLOOD PRESSURE: 76 MMHG | HEART RATE: 57 BPM | BODY MASS INDEX: 32.96 KG/M2 | SYSTOLIC BLOOD PRESSURE: 139 MMHG | TEMPERATURE: 97.1 F | WEIGHT: 216.8 LBS

## 2022-03-09 DIAGNOSIS — I10 ESSENTIAL HYPERTENSION: Primary | ICD-10-CM

## 2022-03-09 DIAGNOSIS — R68.89 FLUCTUATION OF WEIGHT: ICD-10-CM

## 2022-03-09 DIAGNOSIS — F02.80 LATE ONSET ALZHEIMER'S DEMENTIA WITHOUT BEHAVIORAL DISTURBANCE (HCC): ICD-10-CM

## 2022-03-09 DIAGNOSIS — M15.9 OSTEOARTHRITIS OF MULTIPLE JOINTS, UNSPECIFIED OSTEOARTHRITIS TYPE: ICD-10-CM

## 2022-03-09 DIAGNOSIS — G30.1 LATE ONSET ALZHEIMER'S DEMENTIA WITHOUT BEHAVIORAL DISTURBANCE (HCC): ICD-10-CM

## 2022-03-09 PROCEDURE — 99336 PR DOM/R-HOME E/M EST PT MOD HI SEVERITY 40 MINUTES: CPT | Performed by: FAMILY MEDICINE

## 2022-03-09 NOTE — ASSESSMENT & PLAN NOTE
Stable  No reports of behavioral disturbances  Patient is oriented to self only  - Continue Donepezil 10 mg QD  - Continue to monitoring for bradycardia   HR today: 57  - continue weekly BP and HR checks  - Last B12 level 776 (10/31/19), will need to get updated labs  - Continue B12 supplementation via B12 1000 mcg QD

## 2022-03-09 NOTE — ASSESSMENT & PLAN NOTE
BP Readings from Last 3 Encounters:   03/09/22 139/76   12/22/21 128/72   10/13/21 140/75     BP stable and at goal  BP today: 139/76, average is 118/68  Continue HCTZ 12 5 mg daily and KDUR 20mEq BID  Continue blood pressure monitoring weekly

## 2022-03-09 NOTE — LETTER
March 9, 2022     [facility]    Patient: Emily Figueroa   YOB: 1935   Date of Visit: 3/9/2022       Dear Dr Juan Coles: Thank you for referring Emily iFgueroa to me for evaluation  Below are my notes for this consultation  If you have questions, please do not hesitate to call me  I look forward to following your patient along with you  Sincerely,        Kayleigh Begum MD        CC: No Recipients  Kayleigh Begum MD  3/9/2022  2:03 PM  Attested  Nursing Home Visit      NAME: Emily Figueroa  AGE: 81 y o  SEX: male [phone]    DATE OF ENCOUNTER: 3/9/2022    Nursing home/assisted living name: [address]  Patient room number: 517  Code status: Level 1 Full Code    Assessment and Plan     Problem List Items Addressed This Visit        Cardiovascular and Mediastinum    Essential hypertension - Primary     BP Readings from Last 3 Encounters:   03/09/22 139/76   12/22/21 128/72   10/13/21 140/75     BP stable and at goal  BP today: 139/76, average is 118/68  Continue HCTZ 12 5 mg daily and KDUR 20mEq BID  Continue blood pressure monitoring weekly            Nervous and Auditory    Dementia (HCC)     Stable  No reports of behavioral disturbances  Patient is oriented to self only  - Continue Donepezil 10 mg QD  - Continue to monitoring for bradycardia  HR today: 57  - Last B12 level 776 (10/31/19)  - Continue B12 supplementation via B12 1000 mcg QD              Musculoskeletal and Integument    Osteoarthritis     OA stable  No acute concerns  Continue with prn Tylenol 650mg Q6h and Voltaren gel qHS            Other    Fluctuation of weight     Wt Readings from Last 3 Encounters:   03/09/22 98 3 kg (216 lb 12 8 oz)   12/22/21 102 kg (224 lb)   10/13/21 102 kg (224 lb 9 6 oz)     Stable  No reported dietary or activity changes  Continue Ensures BID and monitor weight monthly               No orders of the defined types were placed in this encounter        Chief Complaint     Chief Complaint Patient presents with    Follow-up       History of Present Illness     Patient presents today for health maintenance exam  He was last seen by Dr Anitha Hadley with Dr Pattie Pierce on 12/22/2021  His BP was noted to be stable at 128/72 and on average so his BP was transitioned to weekly checks and the hold parameter on his HCTZ (hold for SBP<110)  Today, BP continues to be stable with average of 118/68  He has no acute concerns at this time  He lost some weight since last visit but is overall still stable and continues with Ensure without issues  The following portions of the patient's history were reviewed and updated as appropriate: allergies, current medications, past family history, past medical history, past social history, past surgical history and problem list     Review of Systems     Review of Systems   Unable to perform ROS: Dementia       Active Problem List     Patient Active Problem List   Diagnosis    Dementia ([facility] [address] )    Left ventricular hypertrophy by electrocardiogram    Essential hypertension    Osteoarthritis    OAB (overactive bladder)    Cervicalgia    Fluctuation of weight    Dry eyes, bilateral       Current Medications   Medications reviewed and updated in facility chart        Current Outpatient Medications:     acetaminophen (TYLENOL) 325 mg tablet, Take 2 tablets (650 mg total) by mouth every 6 (six) hours as needed for fever, Disp: 30 tablet, Rfl: 0    cyanocobalamin 1000 MCG tablet, Take 1 tablet (1,000 mcg total) by mouth daily, Disp: 30 tablet, Rfl: 0    diclofenac sodium (VOLTAREN) 1 %, APPLY 2GM POSTERIOR NECK AT BEDTIME, Disp: 100 g, Rfl: 10    donepezil (ARICEPT) 10 mg tablet, Take 10 mg by mouth daily at bedtime, Disp: , Rfl:     fluticasone (FLONASE) 50 mcg/act nasal spray, 1 spray into each nostril daily as needed for rhinitis (Nasal Congestion), Disp: 16 g, Rfl: 2    hydrochlorothiazide (HYDRODIURIL) 12 5 mg tablet, Take 1 tablet (12 5 mg total) by mouth daily, Disp: 30 tablet, Rfl: 0    Nutritional Supplements (ENSURE PO), Take by mouth 2 (two) times a day, Disp: , Rfl:     polyvinyl alcohol (LIQUIFILM TEARS) 1 4 % ophthalmic solution, Administer 1 drop to both eyes daily, Disp: , Rfl:     potassium chloride (K-DUR,KLOR-CON) 20 mEq tablet, Take 1 tablet (20 mEq total) by mouth 2 (two) times a day, Disp: 60 tablet, Rfl: 0     Objective     /76   Pulse 57   Temp (!) 97 1 °F (36 2 °C)   Wt 98 3 kg (216 lb 12 8 oz)   BMI 32 96 kg/m²     Physical Exam  Constitutional:       General: He is not in acute distress  Appearance: He is not ill-appearing or diaphoretic  HENT:      Head: Normocephalic and atraumatic  Right Ear: External ear normal       Left Ear: External ear normal       Nose: No congestion or rhinorrhea  Mouth/Throat:      Mouth: Mucous membranes are moist    Eyes:      General: No scleral icterus  Conjunctiva/sclera: Conjunctivae normal    Cardiovascular:      Rate and Rhythm: Normal rate and regular rhythm  Heart sounds: Normal heart sounds  Pulmonary:      Effort: Pulmonary effort is normal  No respiratory distress  Abdominal:      General: There is no distension  Palpations: Abdomen is soft  Tenderness: There is no abdominal tenderness  Musculoskeletal:         General: No swelling, tenderness or deformity  Right lower leg: No edema  Left lower leg: No edema  Neurological:      Mental Status: He is alert  Mental status is at baseline  Psychiatric:         Mood and Affect: Mood and affect normal          Speech: Speech is tangential          Behavior: Behavior is cooperative  Cognition and Memory: Cognition is impaired  He exhibits impaired recent memory  Comments: Oriented to self only         Pertinent Laboratory/Diagnostic Studies:  No new labs        Chuck Rodriguez MD  PGY-2 Family Medicine  03/09/22    Attestation signed by Ziyad Garcia MD at 3/9/2022  3:24 PM:  I discussed case with the resident and reviewed resident note  I was present at assisted living and supervised the resident  I agree with the resident management as it was presented to me  Appears less confused today, better mood and able to ambulate to dinning room  Will continue to monitor Wt monthly and plan for FLP in June 2022

## 2022-03-09 NOTE — PROGRESS NOTES
Nursing Home Visit      NAME: Baldemar Chu  AGE: 81 y o  SEX: male [phone]    DATE OF ENCOUNTER: 3/9/2022    Nursing home/assisted living name: [address]  Patient room number: 399  Code status: Level 1 Full Code    Assessment and Plan     Problem List Items Addressed This Visit        Cardiovascular and Mediastinum    Essential hypertension - Primary     BP Readings from Last 3 Encounters:   03/09/22 139/76   12/22/21 128/72   10/13/21 140/75     BP stable and at goal  BP today: 139/76, average is 118/68  Continue HCTZ 12 5 mg daily and KDUR 20mEq BID  Continue blood pressure monitoring weekly            Nervous and Auditory    Dementia (HCC)     Stable  No reports of behavioral disturbances  Patient is oriented to self only  - Continue Donepezil 10 mg QD  - Continue to monitoring for bradycardia  HR today: 57  - Last B12 level 776 (10/31/19)  - Continue B12 supplementation via B12 1000 mcg QD              Musculoskeletal and Integument    Osteoarthritis     OA stable  No acute concerns  Continue with prn Tylenol 650mg Q6h and Voltaren gel qHS            Other    Fluctuation of weight     Wt Readings from Last 3 Encounters:   03/09/22 98 3 kg (216 lb 12 8 oz)   12/22/21 102 kg (224 lb)   10/13/21 102 kg (224 lb 9 6 oz)     Stable  No reported dietary or activity changes  Continue Ensures BID and monitor weight monthly               No orders of the defined types were placed in this encounter  Chief Complaint     Chief Complaint   Patient presents with    Follow-up       History of Present Illness     Patient presents today for health maintenance exam  He was last seen by Dr Matthew Groves with Dr Jeremi Langley on 12/22/2021  His BP was noted to be stable at 128/72 and on average so his BP was transitioned to weekly checks and the hold parameter on his HCTZ (hold for SBP<110)  Today, BP continues to be stable with average of 118/68  He has no acute concerns at this time   He lost some weight since last visit but is overall still stable and continues with Ensure without issues  The following portions of the patient's history were reviewed and updated as appropriate: allergies, current medications, past family history, past medical history, past social history, past surgical history and problem list     Review of Systems     Review of Systems   Unable to perform ROS: Dementia       Active Problem List     Patient Active Problem List   Diagnosis    Dementia ([facility] [address] [facility]    Left ventricular hypertrophy by electrocardiogram    Essential hypertension    Osteoarthritis    OAB (overactive bladder)    Cervicalgia    Fluctuation of weight    Dry eyes, bilateral       Current Medications   Medications reviewed and updated in facility chart        Current Outpatient Medications:     acetaminophen (TYLENOL) 325 mg tablet, Take 2 tablets (650 mg total) by mouth every 6 (six) hours as needed for fever, Disp: 30 tablet, Rfl: 0    cyanocobalamin 1000 MCG tablet, Take 1 tablet (1,000 mcg total) by mouth daily, Disp: 30 tablet, Rfl: 0    diclofenac sodium (VOLTAREN) 1 %, APPLY 2GM POSTERIOR NECK AT BEDTIME, Disp: 100 g, Rfl: 10    donepezil (ARICEPT) 10 mg tablet, Take 10 mg by mouth daily at bedtime, Disp: , Rfl:     fluticasone (FLONASE) 50 mcg/act nasal spray, 1 spray into each nostril daily as needed for rhinitis (Nasal Congestion), Disp: 16 g, Rfl: 2    hydrochlorothiazide (HYDRODIURIL) 12 5 mg tablet, Take 1 tablet (12 5 mg total) by mouth daily, Disp: 30 tablet, Rfl: 0    Nutritional Supplements (ENSURE PO), Take by mouth 2 (two) times a day, Disp: , Rfl:     polyvinyl alcohol (LIQUIFILM TEARS) 1 4 % ophthalmic solution, Administer 1 drop to both eyes daily, Disp: , Rfl:     potassium chloride (K-DUR,KLOR-CON) 20 mEq tablet, Take 1 tablet (20 mEq total) by mouth 2 (two) times a day, Disp: 60 tablet, Rfl: 0     Objective     /76   Pulse 57   Temp (!) 97 1 °F (36 2 °C)   Wt 98 3 kg (216 lb 12 8 oz)   BMI 32 96 kg/m² Physical Exam  Constitutional:       General: He is not in acute distress  Appearance: He is not ill-appearing or diaphoretic  HENT:      Head: Normocephalic and atraumatic  Right Ear: External ear normal       Left Ear: External ear normal       Nose: No congestion or rhinorrhea  Mouth/Throat:      Mouth: Mucous membranes are moist    Eyes:      General: No scleral icterus  Conjunctiva/sclera: Conjunctivae normal    Cardiovascular:      Rate and Rhythm: Normal rate and regular rhythm  Heart sounds: Normal heart sounds  Pulmonary:      Effort: Pulmonary effort is normal  No respiratory distress  Abdominal:      General: There is no distension  Palpations: Abdomen is soft  Tenderness: There is no abdominal tenderness  Musculoskeletal:         General: No swelling, tenderness or deformity  Right lower leg: No edema  Left lower leg: No edema  Neurological:      Mental Status: He is alert  Mental status is at baseline  Psychiatric:         Mood and Affect: Mood and affect normal          Speech: Speech is tangential          Behavior: Behavior is cooperative  Cognition and Memory: Cognition is impaired  He exhibits impaired recent memory  Comments: Oriented to self only         Pertinent Laboratory/Diagnostic Studies:  No new labs        Lazarus Guy, MD  PGY-2 Family Medicine  03/09/22

## 2022-03-09 NOTE — ASSESSMENT & PLAN NOTE
Wt Readings from Last 3 Encounters:   03/09/22 98 3 kg (216 lb 12 8 oz)   12/22/21 102 kg (224 lb)   10/13/21 102 kg (224 lb 9 6 oz)     Stable    No reported dietary or activity changes  Continue Ensures BID and monitor weight monthly

## 2022-06-06 ENCOUNTER — TELEPHONE (OUTPATIENT)
Dept: FAMILY MEDICINE CLINIC | Facility: CLINIC | Age: 87
End: 2022-06-06

## 2022-06-08 ENCOUNTER — NURSING HOME VISIT (OUTPATIENT)
Dept: FAMILY MEDICINE CLINIC | Facility: CLINIC | Age: 87
End: 2022-06-08

## 2022-06-08 VITALS
BODY MASS INDEX: 33.15 KG/M2 | TEMPERATURE: 97.8 F | DIASTOLIC BLOOD PRESSURE: 75 MMHG | WEIGHT: 218 LBS | HEART RATE: 67 BPM | SYSTOLIC BLOOD PRESSURE: 143 MMHG

## 2022-06-08 DIAGNOSIS — F02.80 LATE ONSET ALZHEIMER'S DEMENTIA WITHOUT BEHAVIORAL DISTURBANCE (HCC): Primary | ICD-10-CM

## 2022-06-08 DIAGNOSIS — G30.1 LATE ONSET ALZHEIMER'S DEMENTIA WITHOUT BEHAVIORAL DISTURBANCE (HCC): Primary | ICD-10-CM

## 2022-06-08 DIAGNOSIS — I10 ESSENTIAL HYPERTENSION: ICD-10-CM

## 2022-06-08 PROCEDURE — 99336 PR DOM/R-HOME E/M EST PT MOD HI SEVERITY 40 MINUTES: CPT | Performed by: FAMILY MEDICINE

## 2022-06-08 NOTE — PROGRESS NOTES
Nursing Home Visit      NAME: Debbie Hoffmann  AGE: 81 y o  SEX: male [id number]    DATE OF ENCOUNTER: 6/8/2022    Nursing home/assisted living name: [address]  Patient room number: 549  Code status: Level 1 Full Code    Assessment and Plan     Problem List Items Addressed This Visit        Cardiovascular and Mediastinum    Essential hypertension     BP today 143/75, acceptable  Weekly range also wnl  Continue HCTZ 12 5mg daily and Kdur 20 BID  Continue weekly BP monitoring              Nervous and Auditory    Dementia ([address] ) - Primary     Stable at this time  Patient is oriented to self only  Recently admitted on 6/3 for wandering around [facility] after eloping from nursing facility  - Continue Donepezil 10 mg QD  - Continue to monitoring for bradycardia  HR today: 67  - Last B12 level 776 (10/31/19), will repeat at this time with lipid panel  - Continue B12 supplementation via B12 1000 mcg QD                 No orders of the defined types were placed in this encounter  Chief Complaint     Chief Complaint   Patient presents with   [facility] follow up     History of Present Illness     Patient presents today for a follow up  He was last seen by Dr Riddhi Ocasio + Dr Ammon Moore on 3/9/2022  He had no acute concerns at that time and neither does he now  Although, of note, patient was recently admitted for overnight observation in the hospital on 6/3/2022 after he was found wandering around [facility] parking lot  It was later noted that he left the [place] [address] facility via the back door and got to [facility]  In the ED, CT scan was done to rule out head trauma and that was found to be unremarkable  Vitals and labs were otherwise unremarkable except for mild hypokalemia of 3 3 which was corrected  He was discharged in good condition on 6/4/2022  Patient is very stable today, at baseline and oriented to self only  He denies any acute concerns and has no pain or discomfort   Weight is stable since last visit with weight gain of 2lbs  He denies any issues with sleep or appetite  During the last visit, pt was planned for a repeat lipid panel for June 2022 which is now due  The following portions of the patient's history were reviewed and updated as appropriate: allergies, current medications, past family history, past medical history, past social history, past surgical history and problem list     Review of Systems     Review of Systems   Constitutional: Negative for appetite change, chills and fever  HENT: Negative for congestion, ear pain, postnasal drip, rhinorrhea and sore throat  Eyes: Negative for visual disturbance  Respiratory: Negative for cough and shortness of breath  Cardiovascular: Negative for chest pain and palpitations  Gastrointestinal: Negative for abdominal pain, diarrhea, nausea and vomiting  Genitourinary: Negative for dysuria and hematuria  Skin: Negative for rash  Neurological: Negative for dizziness, facial asymmetry and headaches  Psychiatric/Behavioral: Positive for confusion  Negative for decreased concentration  All other systems reviewed and are negative  Active Problem List     Patient Active Problem List   Diagnosis    Dementia [address] [address]    Left ventricular hypertrophy by electrocardiogram    Essential hypertension    Osteoarthritis    OAB (overactive bladder)    Cervicalgia    Fluctuation of weight    Dry eyes, bilateral       Current Medications   Medications reviewed and updated in facility chart        Current Outpatient Medications:     acetaminophen (TYLENOL) 325 mg tablet, Take 2 tablets (650 mg total) by mouth every 6 (six) hours as needed for fever, Disp: 30 tablet, Rfl: 0    cyanocobalamin 1000 MCG tablet, Take 1 tablet (1,000 mcg total) by mouth daily, Disp: 30 tablet, Rfl: 0    diclofenac sodium (VOLTAREN) 1 %, APPLY 2GM POSTERIOR NECK AT BEDTIME, Disp: 100 g, Rfl: 10    donepezil (ARICEPT) 10 mg tablet, Take 10 mg by mouth daily at bedtime, Disp: , Rfl:     fluticasone (FLONASE) 50 mcg/act nasal spray, 1 spray into each nostril daily as needed for rhinitis (Nasal Congestion), Disp: 16 g, Rfl: 2    hydrochlorothiazide (HYDRODIURIL) 12 5 mg tablet, Take 1 tablet (12 5 mg total) by mouth daily, Disp: 30 tablet, Rfl: 0    Nutritional Supplements (ENSURE PO), Take by mouth 2 (two) times a day, Disp: , Rfl:     polyvinyl alcohol (LIQUIFILM TEARS) 1 4 % ophthalmic solution, Administer 1 drop to both eyes daily, Disp: , Rfl:     potassium chloride (K-DUR,KLOR-CON) 20 mEq tablet, Take 1 tablet (20 mEq total) by mouth 2 (two) times a day, Disp: 60 tablet, Rfl: 0     Objective     /75   Pulse 67   Temp 97 8 °F (36 6 °C)   Wt 98 9 kg (218 lb)   BMI 33 15 kg/m²     Physical Exam  Vitals reviewed  Constitutional:       General: He is not in acute distress  Appearance: He is not ill-appearing  Comments: Strong urine odor noted   HENT:      Head: Normocephalic and atraumatic  Right Ear: External ear normal       Left Ear: External ear normal       Nose: Nose normal    Eyes:      Conjunctiva/sclera: Conjunctivae normal    Cardiovascular:      Rate and Rhythm: Normal rate and regular rhythm  Heart sounds: Normal heart sounds  No murmur heard  Pulmonary:      Effort: Pulmonary effort is normal  No respiratory distress  Breath sounds: Normal breath sounds  No wheezing  Chest:      Chest wall: No tenderness  Abdominal:      General: Bowel sounds are normal  There is no distension  Palpations: Abdomen is soft  There is no mass  Tenderness: There is no abdominal tenderness  Musculoskeletal:         General: No swelling, tenderness or deformity  Normal range of motion  Cervical back: Normal range of motion  Right lower leg: No edema  Left lower leg: No edema  Skin:     General: Skin is warm  Coloration: Skin is not pale  Findings: No bruising, erythema or rash     Neurological: Mental Status: He is alert  He is disoriented  Psychiatric:         Mood and Affect: Mood normal          Behavior: Behavior normal  Behavior is cooperative  Thought Content: Thought content is not paranoid         Pertinent Laboratory/Diagnostic Studies:  CBC on 6/3  Hb - 15 2  WBC - 8 4  Plt - 217    CMP  Na - 142  K - 3 3      Ventura Newman MD  PGY-2 Family Medicine  06/08/22

## 2022-06-08 NOTE — ASSESSMENT & PLAN NOTE
Stable at this time  Patient is oriented to self only  Recently admitted on 6/3 for wandering around [facility] after eloping from nursing facility  - Continue Donepezil 10 mg QD  - Continue to monitoring for bradycardia   HR today: 67  - Last B12 level 776 (10/31/19), will repeat at this time with lipid panel  - Continue B12 supplementation via B12 1000 mcg QD

## 2022-06-08 NOTE — ASSESSMENT & PLAN NOTE
BP today 143/75, acceptable  Weekly range also wnl  Continue HCTZ 12 5mg daily and Kdur 20 BID  Continue weekly BP monitoring

## 2022-06-08 NOTE — LETTER
June 8, 2022     [facility]    Patient: Armaan Hooks   YOB: 1935   Date of Visit: 6/8/2022       Dear Dr Joyce Kaplan: Thank you for referring Armaan Hooks to me for evaluation  Below are my notes for this consultation  If you have questions, please do not hesitate to call me  I look forward to following your patient along with you  Sincerely,        Reji Simon MD        CC: No Recipients  Reji Simon MD  6/8/2022  1:50 PM  Attested  Nursing Home Visit      NAME: Armaan Hooks  AGE: 81 y o  SEX: male [id number]    DATE OF ENCOUNTER: 6/8/2022    Nursing home/assisted living name: [address]  Patient room number: 917  Code status: Level 1 Full Code    Assessment and Plan     Problem List Items Addressed This Visit        Cardiovascular and Mediastinum    Essential hypertension     BP today 143/75, acceptable  Weekly range also wnl  Continue HCTZ 12 5mg daily and Kdur 20 BID  Continue weekly BP monitoring              Nervous and Auditory    Dementia ([facility] [address] ) - Primary     Stable at this time  Patient is oriented to self only  Recently admitted on 6/3 for wandering around [facility] after eloping from nursing facility  - Continue Donepezil 10 mg QD  - Continue to monitoring for bradycardia  HR today: 67  - Last B12 level 776 (10/31/19), will repeat at this time with lipid panel  - Continue B12 supplementation via B12 1000 mcg QD                 No orders of the defined types were placed in this encounter  Chief Complaint     Chief Complaint   Patient presents with   [facility] follow up     History of Present Illness     Patient presents today for a follow up  He was last seen by Dr Burak Beard + Dr Jorge Luis Patel on 3/9/2022  He had no acute concerns at that time and neither does he now  Although, of note, patient was recently admitted for overnight observation in the hospital on 6/3/2022 after he was found wandering around [facility] parking lot   It was later noted that [place] the   [place] [address] facility via the back door and got to [facility]  In the ED, CT scan was done to rule out head trauma and that was found to be unremarkable  Vitals and labs were otherwise unremarkable except for mild hypokalemia of 3 3 which was corrected  He was discharged in good condition on 6/4/2022  Patient is very stable today, at baseline and oriented to self only  He denies any acute concerns and has no pain or discomfort  Weight is stable since last visit with weight gain of 2lbs  He denies any issues with sleep or appetite  During the last visit, pt was planned for a repeat lipid panel for June 2022 which is now due  The following portions of the patient's history were reviewed and updated as appropriate: allergies, current medications, past family history, past medical history, past social history, past surgical history and problem list     Review of Systems     Review of Systems   Constitutional: Negative for appetite change, chills and fever  HENT: Negative for congestion, ear pain, postnasal drip, rhinorrhea and sore throat  Eyes: Negative for visual disturbance  Respiratory: Negative for cough and shortness of breath  Cardiovascular: Negative for chest pain and palpitations  Gastrointestinal: Negative for abdominal pain, diarrhea, nausea and vomiting  Genitourinary: Negative for dysuria and hematuria  Skin: Negative for rash  Neurological: Negative for dizziness, facial asymmetry and headaches  Psychiatric/Behavioral: Positive for confusion  Negative for decreased concentration  All other systems reviewed and are negative        Active Problem List     Patient Active Problem List   Diagnosis    Dementia ([address] )    Left ventricular hypertrophy by electrocardiogram    Essential hypertension    Osteoarthritis    OAB (overactive bladder)    Cervicalgia    Fluctuation of weight    Dry eyes, bilateral       Current Medications   Medications reviewed and updated in facility chart  Current Outpatient Medications:     acetaminophen (TYLENOL) 325 mg tablet, Take 2 tablets (650 mg total) by mouth every 6 (six) hours as needed for fever, Disp: 30 tablet, Rfl: 0    cyanocobalamin 1000 MCG tablet, Take 1 tablet (1,000 mcg total) by mouth daily, Disp: 30 tablet, Rfl: 0    diclofenac sodium (VOLTAREN) 1 %, APPLY 2GM POSTERIOR NECK AT BEDTIME, Disp: 100 g, Rfl: 10    donepezil (ARICEPT) 10 mg tablet, Take 10 mg by mouth daily at bedtime, Disp: , Rfl:     fluticasone (FLONASE) 50 mcg/act nasal spray, 1 spray into each nostril daily as needed for rhinitis (Nasal Congestion), Disp: 16 g, Rfl: 2    hydrochlorothiazide (HYDRODIURIL) 12 5 mg tablet, Take 1 tablet (12 5 mg total) by mouth daily, Disp: 30 tablet, Rfl: 0    Nutritional Supplements (ENSURE PO), Take by mouth 2 (two) times a day, Disp: , Rfl:     polyvinyl alcohol (LIQUIFILM TEARS) 1 4 % ophthalmic solution, Administer 1 drop to both eyes daily, Disp: , Rfl:     potassium chloride (K-DUR,KLOR-CON) 20 mEq tablet, Take 1 tablet (20 mEq total) by mouth 2 (two) times a day, Disp: 60 tablet, Rfl: 0     Objective     /75   Pulse 67   Temp 97 8 °F (36 6 °C)   Wt 98 9 kg (218 lb)   BMI 33 15 kg/m²     Physical Exam  Vitals reviewed  Constitutional:       General: He is not in acute distress  Appearance: He is not ill-appearing  Comments: Strong urine odor noted   HENT:      Head: Normocephalic and atraumatic  Right Ear: External ear normal       Left Ear: External ear normal       Nose: Nose normal    Eyes:      Conjunctiva/sclera: Conjunctivae normal    Cardiovascular:      Rate and Rhythm: Normal rate and regular rhythm  Heart sounds: Normal heart sounds  No murmur heard  Pulmonary:      Effort: Pulmonary effort is normal  No respiratory distress  Breath sounds: Normal breath sounds  No wheezing  Chest:      Chest wall: No tenderness     Abdominal:      General: Bowel sounds are normal  There is no distension  Palpations: Abdomen is soft  There is no mass  Tenderness: There is no abdominal tenderness  Musculoskeletal:         General: No swelling, tenderness or deformity  Normal range of motion  Cervical back: Normal range of motion  Right lower leg: No edema  Left lower leg: No edema  Skin:     General: Skin is warm  Coloration: Skin is not pale  Findings: No bruising, erythema or rash  Neurological:      Mental Status: He is alert  He is disoriented  Psychiatric:         Mood and Affect: Mood normal          Behavior: Behavior normal  Behavior is cooperative  Thought Content: Thought content is not paranoid  Pertinent Laboratory/Diagnostic Studies:  CBC on 6/3  Hb - 15 2  WBC - 8 4  Plt - 217    CMP  Na - 142  K - 3 3      Scott Montanez MD  PGY-2 Family Medicine  06/08/22  Attestation signed by Burgess Mellissa MD at 6/8/2022  3:14 PM:  I discussed case with the resident and reviewed resident note  I was present at assisted living and supervised the resident  I agree with the resident management as it was presented to me  Patient appears at his baseline since return from hospital, safety device will be ordered by Garrett Styles  Will continue to monitor BP and ordered FLP and B12 level

## 2022-06-16 ENCOUNTER — TELEPHONE (OUTPATIENT)
Dept: FAMILY MEDICINE CLINIC | Facility: CLINIC | Age: 87
End: 2022-06-16